# Patient Record
Sex: FEMALE | Race: WHITE | HISPANIC OR LATINO
[De-identification: names, ages, dates, MRNs, and addresses within clinical notes are randomized per-mention and may not be internally consistent; named-entity substitution may affect disease eponyms.]

---

## 2018-09-24 ENCOUNTER — APPOINTMENT (OUTPATIENT)
Dept: NEUROLOGY | Facility: CLINIC | Age: 33
End: 2018-09-24
Payer: COMMERCIAL

## 2018-09-24 VITALS
HEIGHT: 60 IN | SYSTOLIC BLOOD PRESSURE: 130 MMHG | BODY MASS INDEX: 35.34 KG/M2 | WEIGHT: 180 LBS | DIASTOLIC BLOOD PRESSURE: 82 MMHG

## 2018-09-24 DIAGNOSIS — G44.89 OTHER HEADACHE SYNDROME: ICD-10-CM

## 2018-09-24 DIAGNOSIS — Z82.49 FAMILY HISTORY OF ISCHEMIC HEART DISEASE AND OTHER DISEASES OF THE CIRCULATORY SYSTEM: ICD-10-CM

## 2018-09-24 PROCEDURE — 99204 OFFICE O/P NEW MOD 45 MIN: CPT

## 2018-09-24 NOTE — CONSULT LETTER
[Dear  ___] : Dear  [unfilled], [Courtesy Letter:] : I had the pleasure of seeing your patient, [unfilled], in my office today. [Please see my note below.] : Please see my note below. [Consult Closing:] : Thank you very much for allowing me to participate in the care of this patient.  If you have any questions, please do not hesitate to contact me. [Sincerely,] : Sincerely, [FreeTextEntry3] : Armand Watson MD.

## 2018-09-24 NOTE — PHYSICAL EXAM
[General Appearance - Alert] : alert [Oriented To Time, Place, And Person] : oriented to person, place, and time [Memory Recent] : recent memory was not impaired [Memory Remote] : remote memory was not impaired [Cranial Nerves Optic (II)] : visual acuity intact bilaterally,  visual fields full to confrontation, pupils equal round and reactive to light [Cranial Nerves Oculomotor (III)] : extraocular motion intact [Cranial Nerves Trigeminal (V)] : facial sensation intact symmetrically [Cranial Nerves Facial (VII)] : face symmetrical [Cranial Nerves Vestibulocochlear (VIII)] : hearing was intact bilaterally [Cranial Nerves Glossopharyngeal (IX)] : tongue and palate midline [Cranial Nerves Accessory (XI - Cranial And Spinal)] : head turning and shoulder shrug symmetric [Cranial Nerves Hypoglossal (XII)] : there was no tongue deviation with protrusion [Motor Tone] : muscle tone was normal in all four extremities [Motor Strength] : muscle strength was normal in all four extremities [Sensation Tactile Decrease] : light touch was intact [Sensation Pain / Temperature Decrease] : pain and temperature was intact [Sensation Vibration Decrease] : vibration was intact [Abnormal Walk] : normal gait [2+] : Ankle jerk left 2+ [Dysarthria] : no dysarthria [Aphasia] : no dysphasia/aphasia [Romberg's Sign] : Romberg's sign was negtive [Coordination - Dysmetria Impaired Finger-to-Nose Bilateral] : not present [Plantar Reflex Right Only] : normal on the right [Plantar Reflex Left Only] : normal on the left

## 2018-09-24 NOTE — HISTORY OF PRESENT ILLNESS
[FreeTextEntry1] : I saw this patient in the office today.\par \par She describes headaches that have been going on for many years.\par They have progressed to the point where they are daily.\par They wax and wane in intensity but often quite severe.\par When severe they are associated with nausea and/or photophobia.\par \par

## 2018-09-24 NOTE — ASSESSMENT
[FreeTextEntry1] : This is a 32-year-old woman with a long history of chronic.\par Likely this represents migraine that has evolved into chronic daily pattern.\par I will obtain an MRI of the brain.\par \par I have explained that there are essentially 2 strategies for dealing with chronic headaches.  The first is abortive medication of which there are numerous over-the-counter preparations as well as prescription options.  The second strategy is prophylactic medications.  I have explained that these are medications which prevent headaches when taken on a regular basis.  I have explained that there are several medications which have been found to be preventative against headaches.  I have further explained that none of the medications have an immediate effect.  They must build up in the system over a few weeks.  Most people notice that within a few weeks on the medication, the headache intensity is diminishing.  Within a few more weeks most people begin to notice that the frequency is decreasing as well.  I have explained that the preventive medications were all originally used for other conditions but were also found to work against chronic headaches.  The patient seemed to understand my explanation.\par \par I have suggested a trial of Pamelor starting at 25 mg at bedtime in an attempt to decrease the frequency and intensity of the headaches.\par \par I have discussed the potential side effects of the medication with the patient and have advised the patient to call me should any new symptoms occur.\par \par I will see her back in 6 weeks.

## 2018-11-24 ENCOUNTER — FORM ENCOUNTER (OUTPATIENT)
Age: 33
End: 2018-11-24

## 2018-11-25 ENCOUNTER — APPOINTMENT (OUTPATIENT)
Dept: MRI IMAGING | Facility: CLINIC | Age: 33
End: 2018-11-25
Payer: COMMERCIAL

## 2018-11-25 ENCOUNTER — OUTPATIENT (OUTPATIENT)
Dept: OUTPATIENT SERVICES | Facility: HOSPITAL | Age: 33
LOS: 1 days | End: 2018-11-25
Payer: COMMERCIAL

## 2018-11-25 DIAGNOSIS — G44.89 OTHER HEADACHE SYNDROME: ICD-10-CM

## 2018-11-25 PROCEDURE — 70551 MRI BRAIN STEM W/O DYE: CPT | Mod: 26

## 2018-11-25 PROCEDURE — 70551 MRI BRAIN STEM W/O DYE: CPT

## 2019-01-04 ENCOUNTER — APPOINTMENT (OUTPATIENT)
Dept: NEUROLOGY | Facility: CLINIC | Age: 34
End: 2019-01-04

## 2019-10-29 ENCOUNTER — EMERGENCY (EMERGENCY)
Facility: HOSPITAL | Age: 34
LOS: 1 days | Discharge: HOME | End: 2019-10-29
Attending: STUDENT IN AN ORGANIZED HEALTH CARE EDUCATION/TRAINING PROGRAM
Payer: COMMERCIAL

## 2019-10-29 VITALS
TEMPERATURE: 98 F | WEIGHT: 177.91 LBS | SYSTOLIC BLOOD PRESSURE: 177 MMHG | OXYGEN SATURATION: 100 % | HEIGHT: 60 IN | HEART RATE: 108 BPM | RESPIRATION RATE: 20 BRPM | DIASTOLIC BLOOD PRESSURE: 102 MMHG

## 2019-10-29 LAB
ALBUMIN SERPL ELPH-MCNC: 4.8 G/DL — SIGNIFICANT CHANGE UP (ref 3.3–5.2)
ALP SERPL-CCNC: 86 U/L — SIGNIFICANT CHANGE UP (ref 40–120)
ALT FLD-CCNC: 25 U/L — SIGNIFICANT CHANGE UP
ANION GAP SERPL CALC-SCNC: 12 MMOL/L — SIGNIFICANT CHANGE UP (ref 5–17)
APPEARANCE UR: CLEAR — SIGNIFICANT CHANGE UP
AST SERPL-CCNC: 26 U/L — SIGNIFICANT CHANGE UP
BACTERIA # UR AUTO: ABNORMAL
BILIRUB SERPL-MCNC: <0.2 MG/DL — LOW (ref 0.4–2)
BILIRUB UR-MCNC: NEGATIVE — SIGNIFICANT CHANGE UP
BUN SERPL-MCNC: 10 MG/DL — SIGNIFICANT CHANGE UP (ref 8–20)
CALCIUM SERPL-MCNC: 10 MG/DL — SIGNIFICANT CHANGE UP (ref 8.6–10.2)
CHLORIDE SERPL-SCNC: 105 MMOL/L — SIGNIFICANT CHANGE UP (ref 98–107)
CO2 SERPL-SCNC: 25 MMOL/L — SIGNIFICANT CHANGE UP (ref 22–29)
COLOR SPEC: YELLOW — SIGNIFICANT CHANGE UP
CREAT SERPL-MCNC: 0.65 MG/DL — SIGNIFICANT CHANGE UP (ref 0.5–1.3)
DIFF PNL FLD: ABNORMAL
EPI CELLS # UR: SIGNIFICANT CHANGE UP
GLUCOSE SERPL-MCNC: 114 MG/DL — SIGNIFICANT CHANGE UP (ref 70–115)
GLUCOSE UR QL: NEGATIVE MG/DL — SIGNIFICANT CHANGE UP
HCG SERPL-ACNC: <4 MIU/ML — SIGNIFICANT CHANGE UP
HCT VFR BLD CALC: 36.8 % — SIGNIFICANT CHANGE UP (ref 34.5–45)
HGB BLD-MCNC: 11.6 G/DL — SIGNIFICANT CHANGE UP (ref 11.5–15.5)
KETONES UR-MCNC: NEGATIVE — SIGNIFICANT CHANGE UP
LEUKOCYTE ESTERASE UR-ACNC: NEGATIVE — SIGNIFICANT CHANGE UP
LIDOCAIN IGE QN: 37 U/L — SIGNIFICANT CHANGE UP (ref 22–51)
MCHC RBC-ENTMCNC: 26.2 PG — LOW (ref 27–34)
MCHC RBC-ENTMCNC: 31.5 GM/DL — LOW (ref 32–36)
MCV RBC AUTO: 83.1 FL — SIGNIFICANT CHANGE UP (ref 80–100)
NITRITE UR-MCNC: NEGATIVE — SIGNIFICANT CHANGE UP
PH UR: 6.5 — SIGNIFICANT CHANGE UP (ref 5–8)
PLATELET # BLD AUTO: 383 K/UL — SIGNIFICANT CHANGE UP (ref 150–400)
POTASSIUM SERPL-MCNC: 3.8 MMOL/L — SIGNIFICANT CHANGE UP (ref 3.5–5.3)
POTASSIUM SERPL-SCNC: 3.8 MMOL/L — SIGNIFICANT CHANGE UP (ref 3.5–5.3)
PROT SERPL-MCNC: 8.1 G/DL — SIGNIFICANT CHANGE UP (ref 6.6–8.7)
PROT UR-MCNC: NEGATIVE MG/DL — SIGNIFICANT CHANGE UP
RBC # BLD: 4.43 M/UL — SIGNIFICANT CHANGE UP (ref 3.8–5.2)
RBC # FLD: 13.4 % — SIGNIFICANT CHANGE UP (ref 10.3–14.5)
RBC CASTS # UR COMP ASSIST: SIGNIFICANT CHANGE UP /HPF (ref 0–4)
SODIUM SERPL-SCNC: 142 MMOL/L — SIGNIFICANT CHANGE UP (ref 135–145)
SP GR SPEC: 1.02 — SIGNIFICANT CHANGE UP (ref 1.01–1.02)
TROPONIN T SERPL-MCNC: <0.01 NG/ML — SIGNIFICANT CHANGE UP (ref 0–0.06)
UROBILINOGEN FLD QL: NEGATIVE MG/DL — SIGNIFICANT CHANGE UP
WBC # BLD: 9.57 K/UL — SIGNIFICANT CHANGE UP (ref 3.8–10.5)
WBC # FLD AUTO: 9.57 K/UL — SIGNIFICANT CHANGE UP (ref 3.8–10.5)
WBC UR QL: SIGNIFICANT CHANGE UP

## 2019-10-29 PROCEDURE — 70450 CT HEAD/BRAIN W/O DYE: CPT | Mod: 26

## 2019-10-29 PROCEDURE — 99284 EMERGENCY DEPT VISIT MOD MDM: CPT

## 2019-10-29 PROCEDURE — 71046 X-RAY EXAM CHEST 2 VIEWS: CPT | Mod: 26

## 2019-10-29 RX ORDER — ACETAMINOPHEN 500 MG
650 TABLET ORAL ONCE
Refills: 0 | Status: COMPLETED | OUTPATIENT
Start: 2019-10-29 | End: 2019-10-29

## 2019-10-29 RX ADMIN — Medication 650 MILLIGRAM(S): at 22:27

## 2019-10-29 NOTE — ED PROVIDER NOTE - OBJECTIVE STATEMENT
32 yo female hx of htn not on medication. presenting with headache over the last week described as a burning sensation in the head associated with dizziness described as the room spinning especially if leaning forward as well as chest pain described as gradual tightening worse with inspiration. denies recent sick contacts travel cough or congestion fever chills nausea vomiting abdominal pain. last took advil this morning. no hx of cardiac issues non smoker no hx of blood clots. no family hx of cardiac issues MI or sudden death. no numbness or tingling in extremities.

## 2019-10-29 NOTE — ED ADULT NURSE NOTE - CAS EDN DISCHARGE ASSESSMENT
Awake/Symptoms improved/Patient baseline mental status/Alert and oriented to person, place and time/Dressing clean and dry/No adverse reaction to first time med in ED

## 2019-10-29 NOTE — ED PROVIDER NOTE - ATTENDING CONTRIBUTION TO CARE
I, Dr. Paige, performed a face to face bedside interview with this patient regarding history of present illness, review of symptoms and relevant past medical, social and family history.  I completed an independent physical examination.  I have also reviewed the ACP's note(s) and discussed the plan with the ACP.

## 2019-10-29 NOTE — ED PROVIDER NOTE - PATIENT PORTAL LINK FT
You can access the FollowMyHealth Patient Portal offered by [facility] by registering at the following website: [email or web address]. By joining [employer]’s FollowMyHealth portal, you will also be able to view your health information using other applications (apps) compatible with our system.

## 2019-10-29 NOTE — ED PROVIDER NOTE - CLINICAL SUMMARY MEDICAL DECISION MAKING FREE TEXT BOX
female with multiple medical complaints of 1 week headache dizziness chest pain   pt with elevated bp and HR in triage. will check labs ct and ekg and re-eval   tylenol for headache until ct back

## 2019-10-29 NOTE — ED ADULT TRIAGE NOTE - CHIEF COMPLAINT QUOTE
pt c/o headache x 1 week, pt tried Tylenol and Advil midday  today Tylenol at noon.  pt states she came in because her body started shaking.  pt states her balance is off she feels she will fall if she bends over, vision cloudy started this am

## 2019-10-29 NOTE — ED PROVIDER NOTE - NSFOLLOWUPINSTRUCTIONS_ED_ALL_ED_FT
please follow up with primary care doctor   tylenol and motrin for headache   for worsening of symptoms please return to the ER

## 2019-10-29 NOTE — ED ADULT NURSE NOTE - PUPILS PERRL
[address] [facility] EMERGENCY DEPT One [address] [id number] [phone] Work/School Note Date: 4/26/2017 To Whom It May concern: 
 
Houston Qureshi was seen and treated today in the emergency room by the following provider(s): 
Attending Provider: Kishore Scott MD. Alberto Zamora Special Instructions:  Off feet on Thursday and Friday/standing other than up to bathroom and similar Sincerely, 
 
 
 
 
Kishore Scott MD 
 
 
 

yes

## 2019-10-29 NOTE — ED ADULT NURSE NOTE - CHPI ED NUR SYMPTOMS NEG
no vomiting/no weakness/no numbness/no blurred vision/no confusion/no nausea/no change in level of consciousness/no loss of consciousness/no dizziness/no fever

## 2019-10-29 NOTE — ED PROVIDER NOTE - CARDIAC, MLM
Normal rate, regular rhythm.  Heart sounds S1, S2.  No murmurs, rubs or gallops. +left side reproducible chest tenderness.

## 2019-10-29 NOTE — ED ADULT TRIAGE NOTE - BP NONINVASIVE DIASTOLIC (MM HG)
no arm pain R/no back pain/no arthralgia/no myalgia/no muscle cramps/no neck pain/no arm pain L/no joint swelling/no muscle weakness
102

## 2019-10-30 VITALS
OXYGEN SATURATION: 100 % | SYSTOLIC BLOOD PRESSURE: 140 MMHG | RESPIRATION RATE: 20 BRPM | DIASTOLIC BLOOD PRESSURE: 77 MMHG | HEART RATE: 62 BPM

## 2019-10-30 PROCEDURE — 93005 ELECTROCARDIOGRAM TRACING: CPT

## 2019-10-30 PROCEDURE — 84702 CHORIONIC GONADOTROPIN TEST: CPT

## 2019-10-30 PROCEDURE — T1013: CPT

## 2019-10-30 PROCEDURE — 85027 COMPLETE CBC AUTOMATED: CPT

## 2019-10-30 PROCEDURE — 70450 CT HEAD/BRAIN W/O DYE: CPT

## 2019-10-30 PROCEDURE — 96374 THER/PROPH/DIAG INJ IV PUSH: CPT

## 2019-10-30 PROCEDURE — 36415 COLL VENOUS BLD VENIPUNCTURE: CPT

## 2019-10-30 PROCEDURE — 93010 ELECTROCARDIOGRAM REPORT: CPT

## 2019-10-30 PROCEDURE — 71046 X-RAY EXAM CHEST 2 VIEWS: CPT

## 2019-10-30 PROCEDURE — 83690 ASSAY OF LIPASE: CPT

## 2019-10-30 PROCEDURE — 81001 URINALYSIS AUTO W/SCOPE: CPT

## 2019-10-30 PROCEDURE — 84484 ASSAY OF TROPONIN QUANT: CPT

## 2019-10-30 PROCEDURE — 80053 COMPREHEN METABOLIC PANEL: CPT

## 2019-10-30 PROCEDURE — 99284 EMERGENCY DEPT VISIT MOD MDM: CPT | Mod: 25

## 2019-10-30 RX ORDER — KETOROLAC TROMETHAMINE 30 MG/ML
30 SYRINGE (ML) INJECTION ONCE
Refills: 0 | Status: DISCONTINUED | OUTPATIENT
Start: 2019-10-30 | End: 2019-10-30

## 2019-10-30 RX ADMIN — Medication 30 MILLIGRAM(S): at 01:30

## 2020-07-13 ENCOUNTER — APPOINTMENT (OUTPATIENT)
Dept: ANTEPARTUM | Facility: CLINIC | Age: 35
End: 2020-07-13
Payer: MEDICAID

## 2020-07-13 ENCOUNTER — ASOB RESULT (OUTPATIENT)
Age: 35
End: 2020-07-13

## 2020-07-13 PROCEDURE — 76801 OB US < 14 WKS SINGLE FETUS: CPT

## 2020-07-13 PROCEDURE — 76802 OB US < 14 WKS ADDL FETUS: CPT | Mod: 59

## 2020-09-09 ENCOUNTER — APPOINTMENT (OUTPATIENT)
Dept: ANTEPARTUM | Facility: CLINIC | Age: 35
End: 2020-09-09

## 2020-09-14 ENCOUNTER — APPOINTMENT (OUTPATIENT)
Dept: ANTEPARTUM | Facility: CLINIC | Age: 35
End: 2020-09-14
Payer: MEDICAID

## 2020-09-14 ENCOUNTER — ASOB RESULT (OUTPATIENT)
Age: 35
End: 2020-09-14

## 2020-09-14 PROCEDURE — 76811 OB US DETAILED SNGL FETUS: CPT

## 2020-09-14 PROCEDURE — 76812 OB US DETAILED ADDL FETUS: CPT | Mod: 59

## 2020-10-05 ENCOUNTER — APPOINTMENT (OUTPATIENT)
Dept: MATERNAL FETAL MEDICINE | Facility: CLINIC | Age: 35
End: 2020-10-05
Payer: MEDICAID

## 2020-10-05 ENCOUNTER — ASOB RESULT (OUTPATIENT)
Age: 35
End: 2020-10-05

## 2020-10-05 PROCEDURE — 99441: CPT

## 2020-10-13 ENCOUNTER — APPOINTMENT (OUTPATIENT)
Dept: ANTEPARTUM | Facility: CLINIC | Age: 35
End: 2020-10-13
Payer: MEDICAID

## 2020-10-13 ENCOUNTER — ASOB RESULT (OUTPATIENT)
Age: 35
End: 2020-10-13

## 2020-10-13 PROCEDURE — 76816 OB US FOLLOW-UP PER FETUS: CPT | Mod: 59

## 2020-11-10 ENCOUNTER — ASOB RESULT (OUTPATIENT)
Age: 35
End: 2020-11-10

## 2020-11-10 ENCOUNTER — APPOINTMENT (OUTPATIENT)
Dept: ANTEPARTUM | Facility: CLINIC | Age: 35
End: 2020-11-10
Payer: MEDICAID

## 2020-11-10 PROCEDURE — 99072 ADDL SUPL MATRL&STAF TM PHE: CPT

## 2020-11-10 PROCEDURE — 76816 OB US FOLLOW-UP PER FETUS: CPT

## 2020-11-10 PROCEDURE — 76819 FETAL BIOPHYS PROFIL W/O NST: CPT | Mod: 59

## 2020-11-10 PROCEDURE — 76820 UMBILICAL ARTERY ECHO: CPT

## 2020-11-17 ENCOUNTER — APPOINTMENT (OUTPATIENT)
Dept: ANTEPARTUM | Facility: CLINIC | Age: 35
End: 2020-11-17
Payer: MEDICAID

## 2020-11-17 ENCOUNTER — ASOB RESULT (OUTPATIENT)
Age: 35
End: 2020-11-17

## 2020-11-17 ENCOUNTER — APPOINTMENT (OUTPATIENT)
Dept: MATERNAL FETAL MEDICINE | Facility: CLINIC | Age: 35
End: 2020-11-17
Payer: MEDICAID

## 2020-11-17 VITALS
WEIGHT: 197 LBS | OXYGEN SATURATION: 98 % | HEART RATE: 94 BPM | RESPIRATION RATE: 18 BRPM | HEIGHT: 60 IN | SYSTOLIC BLOOD PRESSURE: 110 MMHG | DIASTOLIC BLOOD PRESSURE: 70 MMHG | BODY MASS INDEX: 38.68 KG/M2

## 2020-11-17 DIAGNOSIS — Z86.32 PERSONAL HISTORY OF GESTATIONAL DIABETES: ICD-10-CM

## 2020-11-17 DIAGNOSIS — O13.9 GESTATIONAL [PREGNANCY-INDUCED] HYPERTENSION W/OUT SIGNIFICANT PROTEINURIA, UNSPECIFIED TRIMESTER: ICD-10-CM

## 2020-11-17 DIAGNOSIS — O09.523 SUPERVISION OF ELDERLY MULTIGRAVIDA, THIRD TRIMESTER: ICD-10-CM

## 2020-11-17 DIAGNOSIS — E66.9 OBESITY, UNSPECIFIED: ICD-10-CM

## 2020-11-17 DIAGNOSIS — O99.213 OBESITY COMPLICATING PREGNANCY, THIRD TRIMESTER: ICD-10-CM

## 2020-11-17 PROCEDURE — 76815 OB US LIMITED FETUS(S): CPT | Mod: 59

## 2020-11-17 PROCEDURE — 93976 VASCULAR STUDY: CPT

## 2020-11-17 PROCEDURE — 76820 UMBILICAL ARTERY ECHO: CPT

## 2020-11-17 PROCEDURE — 99072 ADDL SUPL MATRL&STAF TM PHE: CPT

## 2020-11-17 PROCEDURE — 76821 MIDDLE CEREBRAL ARTERY ECHO: CPT

## 2020-11-17 PROCEDURE — 99215 OFFICE O/P EST HI 40 MIN: CPT | Mod: TH

## 2020-11-17 PROCEDURE — 76820 UMBILICAL ARTERY ECHO: CPT | Mod: 59

## 2020-11-17 PROCEDURE — 76819 FETAL BIOPHYS PROFIL W/O NST: CPT | Mod: 59

## 2020-11-17 PROCEDURE — 76821 MIDDLE CEREBRAL ARTERY ECHO: CPT | Mod: 59

## 2020-11-17 RX ORDER — VITAMIN C, CALCIUM, IRON, VITAMIN D3, VITAMIN E, VITAMIN B1, VITAMIN B2, VITAMIN B3, VITAMIN B6, FOLIC ACID, IODINE, ZINC, COPPER, DOCUSATE SODIUM, DOCOSAHEXAENOIC ACID (DHA) 27-1-50 MG
KIT ORAL
Refills: 0 | Status: ACTIVE | COMMUNITY

## 2020-11-17 RX ORDER — NORTRIPTYLINE HYDROCHLORIDE 25 MG/1
25 CAPSULE ORAL
Qty: 60 | Refills: 2 | Status: DISCONTINUED | COMMUNITY
Start: 2018-09-24 | End: 2020-11-17

## 2020-11-17 NOTE — CHIEF COMPLAINT
[G ___] : G [unfilled] [P ___] : P [unfilled] [de-identified] : dichorionic diamniotic twin gestation with fetal growth restriction in twin B

## 2020-11-17 NOTE — OB HISTORY
[Spontaneous] : Spontaneous conception [Sonogram] : sonogram [at ___ wks] : at [unfilled] weeks [Definite:  ___ (Date)] : the last menstrual period was [unfilled] [___] : no pregnancy complications reported [Pregnancy History] : patient did not receive anesthesia [FreeTextEntry1] : Prenatal records were not available for review.\par \par She had genetic counseling with our genetic counselor on October 5, 2020 because of advanced maternal age, fetal echogenic intracardiac focus in fetus 1, and twin pregnancy.

## 2020-11-17 NOTE — VITALS
[US Date: ___] : ultrasound performed on [unfilled]. [GA= ___ Weeks] : Results were GA of [unfilled] weeks [GA= ___ Days] : and [unfilled] day(s) [JOSE by US (date): ___] : The calculated JOSE by US is [unfilled] [By US] : this is the final JOSE

## 2020-11-17 NOTE — DISCUSSION/SUMMARY
[FreeTextEntry1] : She is 30 weeks and 2 days gestation by early ultrasound dating.\par \par She is overweight and obesity has been associated with a number of maternal complications such as gestational diabetes, pre-eclampsia, thrombophlebitis, labor abnormalities, post-term pregnancies,  delivery, and operative complications. Obesity has been associated with adverse fetal outcomes such as late stillbirth and  deliveries.  Obese women also have a two to three-fold increased incidence in congenital anomalies. There were no major fetal malformations seen during the fetal anatomy ultrasound examination of the twin fetuses. She told me that she had a normal gestational diabetes screening test result. \par \par Regarding her advanced maternal age, she had genetic counseling on 2020 at 24 weeks gestation. She declined prenatal diagnostic testing and NIPS testing. There were no major fetal birth defects identified in her fetuses during the second trimester fetal anatomy ultrasound examination.  I told her that advanced maternal age has been associated with a higher incidence of gestational diabetes, and preeclampsia /eclampsia. I also told her that advanced maternal age has been associated with an increased risk of stillbirths. She has not been taking a daily baby aspirin to reduce her risk of developing preeclampsia. \par \par She has a dichorionic diamniotic twin pregnancy. I told her that twins have an increased  morbidity and mortality mostly as a result of prematurity and congenital abnormalities.  I also told her that twin pregnancies are at risk for discordant fetal growth, and intrauterine growth restriction. An ultrasound on 11/10/20 reported that fetus 2 had an estimated fetal weight at the 7th percentile for gestational age which is consistent with fetal growth restriction. Today's ultrasound reported the umbilical artery Doppler for fetus 2 to to be elevated above the 97.5th percentile for gestational age. The fetal cerebral placental ratio was reported to be low which is suggestive of fetal brain sparing effect.  She was advised to reduce her activities, have frequent rest periods and no sexual intercourse. I told her that at this time I recommend twice weekly fetal surveillance with NST/BPP/Dopplers. Fetal growth ultrasounds should be performed at approximately 2 - 3 week intervals. I told her that dichorionic/diamniotic pregnancies with isolated fetal growth restriction and other concurrent conditions (obesity, AMA) should delivered by 36 0/7 to 36 6/7 weeks of gestation (ACOG Committee Opinion No. 764, 2019). I also told her that if fetus 2 continues to have abnormal umbilical artery Doppler studies the delivery may occur before 36 0/7 weeks gestation. She was scheduled to have a followup [facility] visit in 2 weeks. \par \par She states that she has been having total body itching for the past 3 months, mostly during the night. She has been using over the counter Benadryl before bedtime. She also states that she has  dry skin and uses daily moisturizing lotion.  She was advised to use a moisturizing soap bar or pH-balanced body washes to cleanse the skin. She was also advised to apply calamine lotion to her skin, and take Aveeno (oatmeal) bath treatments to help relieve the itching. She was advised to avoid hot or warm showers and baths. I told her to avoid cleansing products that contain strong detergents or perfumes that can make the skin dry or irritated.  I ordered fasting bile acids levels and liver function tests to be done as soon as possible. \par \par She is multiparous and we discussed the various methods available for contraception after delivery. She told me that she wants to have an operative sterilization procedure after her delivery. \par \par

## 2020-11-20 ENCOUNTER — APPOINTMENT (OUTPATIENT)
Dept: ANTEPARTUM | Facility: CLINIC | Age: 35
End: 2020-11-20
Payer: MEDICAID

## 2020-11-20 ENCOUNTER — ASOB RESULT (OUTPATIENT)
Age: 35
End: 2020-11-20

## 2020-11-20 LAB
ALBUMIN SERPL ELPH-MCNC: 3.7 G/DL
ALP BLD-CCNC: 179 U/L
ALT SERPL-CCNC: 45 U/L
AST SERPL-CCNC: 32 U/L
BILE AC SER-MCNC: 8 UMOL/L
BILIRUB DIRECT SERPL-MCNC: 0.1 MG/DL
BILIRUB INDIRECT SERPL-MCNC: 0.1 MG/DL
BILIRUB SERPL-MCNC: 0.2 MG/DL
PROT SERPL-MCNC: 6.7 G/DL

## 2020-11-20 PROCEDURE — 76820 UMBILICAL ARTERY ECHO: CPT

## 2020-11-20 PROCEDURE — 76818 FETAL BIOPHYS PROFILE W/NST: CPT

## 2020-11-20 PROCEDURE — 76821 MIDDLE CEREBRAL ARTERY ECHO: CPT | Mod: 59

## 2020-11-20 PROCEDURE — 76820 UMBILICAL ARTERY ECHO: CPT | Mod: 59

## 2020-11-20 PROCEDURE — 76818 FETAL BIOPHYS PROFILE W/NST: CPT | Mod: 59

## 2020-11-24 ENCOUNTER — APPOINTMENT (OUTPATIENT)
Dept: ANTEPARTUM | Facility: CLINIC | Age: 35
End: 2020-11-24
Payer: MEDICAID

## 2020-11-24 ENCOUNTER — ASOB RESULT (OUTPATIENT)
Age: 35
End: 2020-11-24

## 2020-11-24 PROCEDURE — 99072 ADDL SUPL MATRL&STAF TM PHE: CPT

## 2020-11-24 PROCEDURE — 76821 MIDDLE CEREBRAL ARTERY ECHO: CPT

## 2020-11-24 PROCEDURE — 76818 FETAL BIOPHYS PROFILE W/NST: CPT | Mod: 59

## 2020-11-24 PROCEDURE — 76821 MIDDLE CEREBRAL ARTERY ECHO: CPT | Mod: 59

## 2020-11-24 PROCEDURE — 76820 UMBILICAL ARTERY ECHO: CPT

## 2020-11-24 PROCEDURE — 76816 OB US FOLLOW-UP PER FETUS: CPT

## 2020-11-24 PROCEDURE — 93976 VASCULAR STUDY: CPT

## 2020-11-24 PROCEDURE — 76816 OB US FOLLOW-UP PER FETUS: CPT | Mod: 59

## 2020-12-01 ENCOUNTER — APPOINTMENT (OUTPATIENT)
Dept: MATERNAL FETAL MEDICINE | Facility: CLINIC | Age: 35
End: 2020-12-01
Payer: MEDICAID

## 2020-12-01 ENCOUNTER — ASOB RESULT (OUTPATIENT)
Age: 35
End: 2020-12-01

## 2020-12-01 ENCOUNTER — APPOINTMENT (OUTPATIENT)
Dept: ANTEPARTUM | Facility: CLINIC | Age: 35
End: 2020-12-01
Payer: MEDICAID

## 2020-12-01 VITALS
DIASTOLIC BLOOD PRESSURE: 84 MMHG | HEIGHT: 60 IN | OXYGEN SATURATION: 98 % | BODY MASS INDEX: 39.85 KG/M2 | HEART RATE: 94 BPM | SYSTOLIC BLOOD PRESSURE: 128 MMHG | RESPIRATION RATE: 18 BRPM | WEIGHT: 203 LBS

## 2020-12-01 DIAGNOSIS — O36.5992: ICD-10-CM

## 2020-12-01 DIAGNOSIS — O30.043 TWIN PREGNANCY, DICHORIONIC/DIAMNIOTIC, THIRD TRIMESTER: ICD-10-CM

## 2020-12-01 DIAGNOSIS — O36.5991 MATERNAL CARE FOR OTHER KNOWN OR SUSPECTED POOR FETAL GROWTH, UNSPECIFIED TRIMESTER, FETUS 1: ICD-10-CM

## 2020-12-01 DIAGNOSIS — O99.713 DISEASES OF THE SKIN AND SUBCUTANEOUS TISSUE COMPLICATING PREGNANCY, THIRD TRIMESTER: ICD-10-CM

## 2020-12-01 DIAGNOSIS — L29.9 DISEASES OF THE SKIN AND SUBCUTANEOUS TISSUE COMPLICATING PREGNANCY, THIRD TRIMESTER: ICD-10-CM

## 2020-12-01 DIAGNOSIS — Z3A.32 32 WEEKS GESTATION OF PREGNANCY: ICD-10-CM

## 2020-12-01 PROCEDURE — 93976 VASCULAR STUDY: CPT

## 2020-12-01 PROCEDURE — 76821 MIDDLE CEREBRAL ARTERY ECHO: CPT | Mod: 59

## 2020-12-01 PROCEDURE — 76820 UMBILICAL ARTERY ECHO: CPT

## 2020-12-01 PROCEDURE — 76818 FETAL BIOPHYS PROFILE W/NST: CPT | Mod: 59

## 2020-12-01 PROCEDURE — 99214 OFFICE O/P EST MOD 30 MIN: CPT | Mod: TH

## 2020-12-01 PROCEDURE — 76818 FETAL BIOPHYS PROFILE W/NST: CPT

## 2020-12-01 PROCEDURE — 99072 ADDL SUPL MATRL&STAF TM PHE: CPT

## 2020-12-01 RX ORDER — DIPHENHYDRAMINE HCL 2 %
25 CREAM (GRAM) TOPICAL
Refills: 0 | Status: ACTIVE | COMMUNITY

## 2020-12-01 NOTE — DISCUSSION/SUMMARY
[FreeTextEntry1] : She is 32 weeks and 2 days gestation by early ultrasound dating.\par \par She has a dichorionic diamniotic twin pregnancy. I  previously told her that twin pregnancies are at risk for discordant fetal growth, and intrauterine growth restriction. The last fetal growth ultrasound was done on 11/24/20 and reported twin A to have an estimated fetal weight of 3 lbs. 5 oz. or 1501 g which was at the 9th percentile for gestational age. Twin B was found to have an estimated fetal weight of 3 lbs. 4 oz. or 1485 g which is at the 8th percentile for gestational age. Both fetuses are considered to have fetal growth restriction due to the estimated fetal weights being less than the 10th percentile for gestational age. There was concordant fetal growth. \par \par Today's ultrasound reported the umbilical artery Doppler for twin A  to to be within normal limits for gestational age. The fetal cerebral placental ratio for twin A was reported to within normal limits.  The umbilical artery SD ratio for twin B was reported to be within normal limits. The fetal cerebral placental ratio for twin B was reported to be within normal limits. The biophysical profiles for twin A and twin B were reported to be within normal limits with scores of 10/10. I told her that both fetuses appeared to be healthy at this time based on the biophysical profile and Doppler testing results. She was again advised to reduce her activities, have frequent rest periods and no sexual intercourse. I told her that at this time I recommend once weekly fetal surveillance with NST/BPP/Dopplers. I told her that fetal growth ultrasounds should be performed at approximately 2 - 3 week intervals. I told her that dichorionic/diamniotic pregnancies with isolated fetal growth restriction and other concurrent conditions (obesity, AMA) should delivered by 36 0/7 to 36 6/7 weeks of gestation (ACOG Committee Opinion No. 764, February 2019). She was scheduled to have a followup [facility] visit in 2 weeks. \par \par She states that she continues to have total body itching. The itching gets worse during the night. She continues to take over the counter Benadryl before bedtime. She also states that she has dry skin and uses daily moisturizing lotion. I again told her to use a moisturizing soap bar or pH-balanced body washes to cleanse the skin. She states that she has been using the recommended calamine lotion to her skin with some relief of the itching. She again was advised to avoid hot or warm showers and baths. I told her to avoid cleansing products that contain strong detergents or perfumes that can make the skin dry or irritated.  I told her that the fasting bile acids levels and liver function tests done on 11/18/20 were reported to be within normal limits. I gave her a laboratory referral to repeat the fasting bile acids levels and liver function tests. \par \par

## 2020-12-01 NOTE — OB HISTORY
[Spontaneous] : Spontaneous conception [Sonogram] : sonogram [at ___ wks] : at [unfilled] weeks [___] : no pregnancy complications reported [Pregnancy History] : patient did not receive anesthesia [FreeTextEntry1] : Prenatal records were not available for review.\par \par She had genetic counseling with our genetic counselor on October 5, 2020 because of advanced maternal age, fetal echogenic intracardiac focus in fetus 1, and twin pregnancy. \par \par She had a maternal fetal medicine consultation with me on November 17, 2020 because of her twin gestation, isolated fetal growth in twin B, advanced maternal age, obesity, and pruritus during pregnancy.\par

## 2020-12-01 NOTE — CHIEF COMPLAINT
[G ___] : G [unfilled] [P ___] : P [unfilled] [de-identified] : dichorionic diamniotic twin gestation with fetal growth restriction

## 2020-12-01 NOTE — FAMILY HISTORY
[Age 35+ During Pregnancy] : not 35 or over during pregnancy [Reported Family History Of Birth Defects] : no congenital heart defects [Alfred-Sachs Carrier] : no Alfred-Sachs [Family History] : no mental retardation/autism [Reported Family History Of Genetic Disease] : no history of child defect in child of baby father

## 2020-12-04 ENCOUNTER — APPOINTMENT (OUTPATIENT)
Dept: ANTEPARTUM | Facility: CLINIC | Age: 35
End: 2020-12-04

## 2020-12-07 ENCOUNTER — TRANSCRIPTION ENCOUNTER (OUTPATIENT)
Age: 35
End: 2020-12-07

## 2020-12-08 ENCOUNTER — ASOB RESULT (OUTPATIENT)
Age: 35
End: 2020-12-08

## 2020-12-08 ENCOUNTER — APPOINTMENT (OUTPATIENT)
Dept: ANTEPARTUM | Facility: CLINIC | Age: 35
End: 2020-12-08
Payer: MEDICAID

## 2020-12-08 ENCOUNTER — TRANSCRIPTION ENCOUNTER (OUTPATIENT)
Age: 35
End: 2020-12-08

## 2020-12-08 ENCOUNTER — INPATIENT (INPATIENT)
Facility: HOSPITAL | Age: 35
LOS: 3 days | Discharge: HOME | End: 2020-12-12
Attending: OBSTETRICS & GYNECOLOGY | Admitting: OBSTETRICS & GYNECOLOGY
Payer: COMMERCIAL

## 2020-12-08 VITALS
DIASTOLIC BLOOD PRESSURE: 97 MMHG | HEART RATE: 93 BPM | RESPIRATION RATE: 14 BRPM | SYSTOLIC BLOOD PRESSURE: 140 MMHG | TEMPERATURE: 98 F

## 2020-12-08 DIAGNOSIS — O36.5990 MATERNAL CARE FOR OTHER KNOWN OR SUSPECTED POOR FETAL GROWTH, UNSPECIFIED TRIMESTER, NOT APPLICABLE OR UNSPECIFIED: ICD-10-CM

## 2020-12-08 DIAGNOSIS — Z3A.33 33 WEEKS GESTATION OF PREGNANCY: ICD-10-CM

## 2020-12-08 DIAGNOSIS — O30.043 TWIN PREGNANCY, DICHORIONIC/DIAMNIOTIC, THIRD TRIMESTER: ICD-10-CM

## 2020-12-08 DIAGNOSIS — Z90.49 ACQUIRED ABSENCE OF OTHER SPECIFIED PARTS OF DIGESTIVE TRACT: Chronic | ICD-10-CM

## 2020-12-08 DIAGNOSIS — O24.410 GESTATIONAL DIABETES MELLITUS IN PREGNANCY, DIET CONTROLLED: ICD-10-CM

## 2020-12-08 DIAGNOSIS — E66.9 OBESITY, UNSPECIFIED: ICD-10-CM

## 2020-12-08 DIAGNOSIS — O99.719 DISEASES OF THE SKIN AND SUBCUTANEOUS TISSUE COMPLICATING PREGNANCY, UNSPECIFIED TRIMESTER: ICD-10-CM

## 2020-12-08 DIAGNOSIS — Z29.9 ENCOUNTER FOR PROPHYLACTIC MEASURES, UNSPECIFIED: ICD-10-CM

## 2020-12-08 DIAGNOSIS — O26.893 OTHER SPECIFIED PREGNANCY RELATED CONDITIONS, THIRD TRIMESTER: ICD-10-CM

## 2020-12-08 DIAGNOSIS — O14.13 SEVERE PRE-ECLAMPSIA, THIRD TRIMESTER: ICD-10-CM

## 2020-12-08 DIAGNOSIS — O47.03 FALSE LABOR BEFORE 37 COMPLETED WEEKS OF GESTATION, THIRD TRIMESTER: ICD-10-CM

## 2020-12-08 LAB
ALBUMIN SERPL ELPH-MCNC: 3.4 G/DL — SIGNIFICANT CHANGE UP (ref 3.3–5.2)
ALP SERPL-CCNC: 261 U/L — HIGH (ref 40–120)
ALT FLD-CCNC: 118 U/L — HIGH
ANION GAP SERPL CALC-SCNC: 13 MMOL/L — SIGNIFICANT CHANGE UP (ref 5–17)
APPEARANCE UR: CLEAR — SIGNIFICANT CHANGE UP
APTT BLD: 28.8 SEC — SIGNIFICANT CHANGE UP (ref 27.5–35.5)
AST SERPL-CCNC: 94 U/L — HIGH
BACTERIA # UR AUTO: ABNORMAL
BASOPHILS # BLD AUTO: 0.04 K/UL — SIGNIFICANT CHANGE UP (ref 0–0.2)
BASOPHILS NFR BLD AUTO: 0.5 % — SIGNIFICANT CHANGE UP (ref 0–2)
BILIRUB SERPL-MCNC: 0.4 MG/DL — SIGNIFICANT CHANGE UP (ref 0.4–2)
BILIRUB UR-MCNC: NEGATIVE — SIGNIFICANT CHANGE UP
BLD GP AB SCN SERPL QL: SIGNIFICANT CHANGE UP
BUN SERPL-MCNC: 7 MG/DL — LOW (ref 8–20)
CALCIUM SERPL-MCNC: 10.1 MG/DL — SIGNIFICANT CHANGE UP (ref 8.6–10.2)
CHLORIDE SERPL-SCNC: 103 MMOL/L — SIGNIFICANT CHANGE UP (ref 98–107)
CO2 SERPL-SCNC: 17 MMOL/L — LOW (ref 22–29)
COLOR SPEC: YELLOW — SIGNIFICANT CHANGE UP
CREAT ?TM UR-MCNC: 29 MG/DL — SIGNIFICANT CHANGE UP
CREAT SERPL-MCNC: 0.51 MG/DL — SIGNIFICANT CHANGE UP (ref 0.5–1.3)
DIFF PNL FLD: NEGATIVE — SIGNIFICANT CHANGE UP
EOSINOPHIL # BLD AUTO: 0.03 K/UL — SIGNIFICANT CHANGE UP (ref 0–0.5)
EOSINOPHIL NFR BLD AUTO: 0.4 % — SIGNIFICANT CHANGE UP (ref 0–6)
EPI CELLS # UR: SIGNIFICANT CHANGE UP
FIBRINOGEN PPP-MCNC: 872 MG/DL — CRITICAL HIGH (ref 290–520)
GLUCOSE SERPL-MCNC: 65 MG/DL — LOW (ref 70–99)
GLUCOSE UR QL: NEGATIVE MG/DL — SIGNIFICANT CHANGE UP
HCT VFR BLD CALC: 37.8 % — SIGNIFICANT CHANGE UP (ref 34.5–45)
HGB BLD-MCNC: 12.4 G/DL — SIGNIFICANT CHANGE UP (ref 11.5–15.5)
HIV 1 & 2 AB SERPL IA.RAPID: SIGNIFICANT CHANGE UP
IMM GRANULOCYTES NFR BLD AUTO: 1.2 % — SIGNIFICANT CHANGE UP (ref 0–1.5)
INR BLD: 0.97 RATIO — SIGNIFICANT CHANGE UP (ref 0.88–1.16)
KETONES UR-MCNC: NEGATIVE — SIGNIFICANT CHANGE UP
LDH SERPL L TO P-CCNC: 272 U/L — HIGH (ref 98–192)
LEUKOCYTE ESTERASE UR-ACNC: ABNORMAL
LYMPHOCYTES # BLD AUTO: 1.61 K/UL — SIGNIFICANT CHANGE UP (ref 1–3.3)
LYMPHOCYTES # BLD AUTO: 20.9 % — SIGNIFICANT CHANGE UP (ref 13–44)
MAGNESIUM SERPL-MCNC: 4.6 MG/DL — HIGH (ref 1.6–2.6)
MCHC RBC-ENTMCNC: 27.3 PG — SIGNIFICANT CHANGE UP (ref 27–34)
MCHC RBC-ENTMCNC: 32.8 GM/DL — SIGNIFICANT CHANGE UP (ref 32–36)
MCV RBC AUTO: 83.1 FL — SIGNIFICANT CHANGE UP (ref 80–100)
MONOCYTES # BLD AUTO: 0.38 K/UL — SIGNIFICANT CHANGE UP (ref 0–0.9)
MONOCYTES NFR BLD AUTO: 4.9 % — SIGNIFICANT CHANGE UP (ref 2–14)
NEUTROPHILS # BLD AUTO: 5.56 K/UL — SIGNIFICANT CHANGE UP (ref 1.8–7.4)
NEUTROPHILS NFR BLD AUTO: 72.1 % — SIGNIFICANT CHANGE UP (ref 43–77)
NITRITE UR-MCNC: NEGATIVE — SIGNIFICANT CHANGE UP
PH UR: 7 — SIGNIFICANT CHANGE UP (ref 5–8)
PLATELET # BLD AUTO: 245 K/UL — SIGNIFICANT CHANGE UP (ref 150–400)
POTASSIUM SERPL-MCNC: 4 MMOL/L — SIGNIFICANT CHANGE UP (ref 3.5–5.3)
POTASSIUM SERPL-SCNC: 4 MMOL/L — SIGNIFICANT CHANGE UP (ref 3.5–5.3)
PROT ?TM UR-MCNC: 18 MG/DL — HIGH (ref 0–12)
PROT SERPL-MCNC: 6.9 G/DL — SIGNIFICANT CHANGE UP (ref 6.6–8.7)
PROT UR-MCNC: 30 MG/DL
PROT/CREAT UR-RTO: 0.6 RATIO — HIGH
PROTHROM AB SERPL-ACNC: 11.3 SEC — SIGNIFICANT CHANGE UP (ref 10.6–13.6)
RBC # BLD: 4.55 M/UL — SIGNIFICANT CHANGE UP (ref 3.8–5.2)
RBC # FLD: 15.6 % — HIGH (ref 10.3–14.5)
RBC CASTS # UR COMP ASSIST: SIGNIFICANT CHANGE UP /HPF (ref 0–4)
SARS-COV-2 RNA SPEC QL NAA+PROBE: SIGNIFICANT CHANGE UP
SODIUM SERPL-SCNC: 133 MMOL/L — LOW (ref 135–145)
SP GR SPEC: 1 — LOW (ref 1.01–1.02)
URATE SERPL-MCNC: 6.9 MG/DL — HIGH (ref 2.4–5.7)
UROBILINOGEN FLD QL: NEGATIVE MG/DL — SIGNIFICANT CHANGE UP
WBC # BLD: 7.71 K/UL — SIGNIFICANT CHANGE UP (ref 3.8–10.5)
WBC # FLD AUTO: 7.71 K/UL — SIGNIFICANT CHANGE UP (ref 3.8–10.5)
WBC UR QL: SIGNIFICANT CHANGE UP

## 2020-12-08 PROCEDURE — 76818 FETAL BIOPHYS PROFILE W/NST: CPT | Mod: 59

## 2020-12-08 PROCEDURE — 76820 UMBILICAL ARTERY ECHO: CPT | Mod: 59

## 2020-12-08 PROCEDURE — 93976 VASCULAR STUDY: CPT

## 2020-12-08 PROCEDURE — 76818 FETAL BIOPHYS PROFILE W/NST: CPT

## 2020-12-08 PROCEDURE — 76820 UMBILICAL ARTERY ECHO: CPT

## 2020-12-08 PROCEDURE — 76821 MIDDLE CEREBRAL ARTERY ECHO: CPT

## 2020-12-08 PROCEDURE — 99072 ADDL SUPL MATRL&STAF TM PHE: CPT

## 2020-12-08 PROCEDURE — 76815 OB US LIMITED FETUS(S): CPT | Mod: 59

## 2020-12-08 PROCEDURE — 99222 1ST HOSP IP/OBS MODERATE 55: CPT | Mod: GC

## 2020-12-08 PROCEDURE — 99231 SBSQ HOSP IP/OBS SF/LOW 25: CPT

## 2020-12-08 RX ORDER — MAGNESIUM SULFATE 500 MG/ML
2 VIAL (ML) INJECTION
Qty: 40 | Refills: 0 | Status: DISCONTINUED | OUTPATIENT
Start: 2020-12-08 | End: 2020-12-08

## 2020-12-08 RX ORDER — FAMOTIDINE 10 MG/ML
20 INJECTION INTRAVENOUS ONCE
Refills: 0 | Status: COMPLETED | OUTPATIENT
Start: 2020-12-08 | End: 2020-12-08

## 2020-12-08 RX ORDER — CEFAZOLIN SODIUM 1 G
2000 VIAL (EA) INJECTION ONCE
Refills: 0 | Status: COMPLETED | OUTPATIENT
Start: 2020-12-08 | End: 2020-12-08

## 2020-12-08 RX ORDER — MAGNESIUM SULFATE 500 MG/ML
4 VIAL (ML) INJECTION ONCE
Refills: 0 | Status: DISCONTINUED | OUTPATIENT
Start: 2020-12-08 | End: 2020-12-08

## 2020-12-08 RX ORDER — MAGNESIUM SULFATE 500 MG/ML
4 VIAL (ML) INJECTION ONCE
Refills: 0 | Status: COMPLETED | OUTPATIENT
Start: 2020-12-08 | End: 2020-12-08

## 2020-12-08 RX ORDER — OXYTOCIN 10 UNIT/ML
333.33 VIAL (ML) INJECTION
Qty: 20 | Refills: 0 | Status: DISCONTINUED | OUTPATIENT
Start: 2020-12-08 | End: 2020-12-12

## 2020-12-08 RX ORDER — SODIUM CHLORIDE 9 MG/ML
1000 INJECTION, SOLUTION INTRAVENOUS
Refills: 0 | Status: DISCONTINUED | OUTPATIENT
Start: 2020-12-08 | End: 2020-12-09

## 2020-12-08 RX ORDER — SODIUM CHLORIDE 9 MG/ML
1000 INJECTION, SOLUTION INTRAVENOUS ONCE
Refills: 0 | Status: COMPLETED | OUTPATIENT
Start: 2020-12-08 | End: 2020-12-09

## 2020-12-08 RX ORDER — FAMOTIDINE 10 MG/ML
20 INJECTION INTRAVENOUS ONCE
Refills: 0 | Status: COMPLETED | OUTPATIENT
Start: 2020-12-08 | End: 2020-12-09

## 2020-12-08 RX ORDER — CITRIC ACID/SODIUM CITRATE 300-500 MG
30 SOLUTION, ORAL ORAL ONCE
Refills: 0 | Status: COMPLETED | OUTPATIENT
Start: 2020-12-08 | End: 2020-12-09

## 2020-12-08 RX ORDER — MAGNESIUM SULFATE 500 MG/ML
2 VIAL (ML) INJECTION
Qty: 40 | Refills: 0 | Status: COMPLETED | OUTPATIENT
Start: 2020-12-08 | End: 2020-12-09

## 2020-12-08 RX ORDER — METOCLOPRAMIDE HCL 10 MG
10 TABLET ORAL ONCE
Refills: 0 | Status: DISCONTINUED | OUTPATIENT
Start: 2020-12-08 | End: 2020-12-10

## 2020-12-08 RX ADMIN — Medication 50 GM/HR: at 19:37

## 2020-12-08 RX ADMIN — FAMOTIDINE 20 MILLIGRAM(S): 10 INJECTION INTRAVENOUS at 17:30

## 2020-12-08 RX ADMIN — Medication 12 MILLIGRAM(S): at 17:33

## 2020-12-08 RX ADMIN — SODIUM CHLORIDE 125 MILLILITER(S): 9 INJECTION, SOLUTION INTRAVENOUS at 19:08

## 2020-12-08 RX ADMIN — Medication 200 GRAM(S): at 19:10

## 2020-12-08 NOTE — OB PROVIDER TRIAGE NOTE - CURRENT PREGNANCY COMPLICATIONS, OB PROFILE
Gestational Age less than 36 Weeks/Preeclampsia/Intrauterine Growth Restriction/Gestational Diabetes

## 2020-12-08 NOTE — OB PROVIDER H&P - NSHPPHYSICALEXAM_GEN_ALL_CORE
Vital Signs Last 24 Hrs  T(C): 36.8 (08 Dec 2020 15:52), Max: 36.8 (08 Dec 2020 15:44)  T(F): 98.24 (08 Dec 2020 15:52), Max: 98.24 (08 Dec 2020 15:52)  HR: 100 (08 Dec 2020 18:23) (85 - 114)  BP: 145/92 (08 Dec 2020 18:23) (140/97 - 161/87)  RR: 14 (08 Dec 2020 15:52) (14 - 14)  General: Alert and oriented x3, no acute distress  Cardiovascular: regular rate and rhythm, no murmurs, rubs or gallops appreciated on exam  Respiratory: clear to auscultation bilaterally  Abdominal: gravid uterus, non-tender to palpation  Pelvic: deferred   Extremities: no redness, tenderness or swelling in lower extremities bilaterally     FHT:   A) 150 bpm baseline , moderate variability, +accels, -deccels  B) 140 bpm baseilne, mod variability, +accels, -deccels    toco: irregular contractions q 4 minutes Vital Signs Last 24 Hrs  T(C): 36.8 (08 Dec 2020 15:52), Max: 36.8 (08 Dec 2020 15:44)  T(F): 98.24 (08 Dec 2020 15:52), Max: 98.24 (08 Dec 2020 15:52)  HR: 100 (08 Dec 2020 18:23) (85 - 114)  BP: 145/92 (08 Dec 2020 18:23) (140/97 - 161/87)  RR: 14 (08 Dec 2020 15:52) (14 - 14)  General: Alert and oriented x3, no acute distress  Cardiovascular: regular rate and rhythm, no murmurs, rubs or gallops appreciated on exam  Respiratory: clear to auscultation bilaterally  Abdominal: gravid uterus, non-tender to palpation  Pelvic: 1/20/-3  Extremities: no redness, tenderness or swelling in lower extremities bilaterally     FHT:   A) 150 bpm baseline , moderate variability, +accels, -deccels  B) 140 bpm baseilne, mod variability, +accels, -deccels    toco: irregular contractions q 4 minutes

## 2020-12-08 NOTE — OB PROVIDER H&P - NS_OBGYNHISTORY_OBGYN_ALL_OB_FT
obhx:  G1: 2001 normal spontaneous vaginal delivery   G2: 2004 normal spontaneous vaginal delivery, complicated by elevated BP  G3: 2007 normal spontaneous vaginal delivery  G4: 2011 normal spontaneous vaginal delivery   gynhx:  no history of abnormal pap smears, no history of STDs

## 2020-12-08 NOTE — CONSULT NOTE ADULT - PROBLEM SELECTOR RECOMMENDATION 6
- SCDs when not ambulating Increases risks for  delivery and postpartum complications. Venous thrombosis prophylaxis recommended

## 2020-12-08 NOTE — OB RN TRIAGE NOTE - CURRENT PREGNANCY COMPLICATIONS, OB PROFILE
Gestational Age less than 36 Weeks/Maternal Unknown GBS/GDMA1; Di-Di twins/Hypertensive Disorder/Gestational Diabetes/Multiple Gestation

## 2020-12-08 NOTE — OB RN PATIENT PROFILE - PMH
(normal spontaneous vaginal delivery)  ,  (high BP), ,    <<----- Click to add NO pertinent Past Medical History

## 2020-12-08 NOTE — OB PROVIDER H&P - CURRENT PREGNANCY COMPLICATIONS, OB PROFILE
Intrauterine Growth Restriction/Gestational Diabetes/Preeclampsia/Gestational Age less than 36 Weeks

## 2020-12-08 NOTE — OB RN PATIENT PROFILE - HARM RISK FACTORS
Overall lab results look good; as you are aware blood sugar control is suboptimal; hgb a1c 8.9%  No exposure to hep C; urinary microalbumin is low; folate/B12 OK, vit D a bit low--increase over the counter supplement;  One isolated liver test abnormal; repeat in future ;lipids acceptable.   no

## 2020-12-08 NOTE — OB PROVIDER H&P - PMH
<<----- Click to add NO pertinent Past Medical History  (normal spontaneous vaginal delivery)  ,  (high BP), ,

## 2020-12-08 NOTE — CHART NOTE - NSCHARTNOTEFT_GEN_A_CORE
Patient had a severe range BP at 21:21 of 161/67  BPs were cycled and the rest were found to be in normal range   Patient already with diagnosis of PEC with severe features currently on Mg  Plan for rescue steroids and delivery by CS for malpresentation  Will continue to monitor BPs and start PO anti-HTN meds if patient continues to have severe range BPs    D/W Dr. Bolden and Dr. Samson

## 2020-12-08 NOTE — OB PROVIDER TRIAGE NOTE - NSOBPROVIDERNOTE_OBGYN_ALL_OB_FT
PAT ZAVALA is a 35y  at 33w2d who presented to L&D for rule out pre-eclampsia.     - f/u PIH labs  - betamethasome 12mg q 24 hours for 2 doses for rescue course  - VICKI ZAVALAA is a 35y  at 33w2d who presented to L&D for rule out pre-eclampsia.     - f/u PIH labs  - betamethasone 12mg q 24 hours for 2 doses for rescue course  - continue to monitor BP    d/w Dr. Cunningham PAT ZAVALA is a 35y  at 33w2d who presented to L&D for rule out pre-eclampsia.     - f/u PIH labs  - betamethasone 12mg q 24 hours for 2 doses for rescue course  - continue to monitor BP    d/w Dr. Cunningham    OB attending:   see H&P note  Yessica Cunningham MD

## 2020-12-08 NOTE — OB PROVIDER TRIAGE NOTE - HISTORY OF PRESENT ILLNESS
PAT ZAVALA is a 35y  at 33w2d who presented to L&D for hypertension. She stated that she was in the office with elevated BP and was told to come into L&D. She denies any headache, blurry vision, right upper quadrant pain, or persistent swelling. Patient denies any regular painful contractions, leakage of fluid, vaginal bleeding, pain with urination, blood in the urine and reports good fetal movement. This pregnancy is complicated by a di/di twin gestation, GDMA1, personal history of pre-eclampsia and maternal pruritis for which she stated that she was told "everything was fine," and was prescribed benadryl for the itchiness.     This pregnancy is also complicated by IUGR   fetus A: EFW 9th%ile/ AC 7%ile  fetus B: EFW 8th%ile/ AC 4.2%ile    She was previously admitted to [facility] for pre-eclampsia and given betamethasone on 10/23 and 10/24.     obhx:  G1:  normal spontaneous vaginal delivery   G2:  normal spontaneous vaginal delivery, complicated by elevated BP  G3:  normal spontaneous vaginal delivery  G4:  normal spontaneous vaginal delivery   gynhx:  no history of abnormal pap smears, no history of STDs  pmhx: none  pshx: cholecystectomy   meds: pnv  allergies: nkda

## 2020-12-08 NOTE — OB RN PATIENT PROFILE - CURRENT PREGNANCY COMPLICATIONS, OB PROFILE
Gestational Age less than 36 Weeks/Intrauterine Growth Restriction/Maternal Unknown GBS/Preeclampsia/Gestational Diabetes/Multiple Gestation

## 2020-12-08 NOTE — CONSULT NOTE ADULT - ASSESSMENT
VICKI ZAVALAA is a 35y  at 33w2d who presented to L&D for hypertension at the office. She is being admitted for pre-eclampsia with severe features due to elevated LFTs. ZAVALAPAT HERNANDEZ is a 35y  at 33w2d wit Di-Di twin pregnancy, diet controlled GDM and obesity. She is being admitted for pre-eclampsia with severe features.

## 2020-12-08 NOTE — OB PROVIDER H&P - ASSESSMENT
PAT ZAVALA is a 35y  at 33w2d who presented to L&D for hypertension. She stated that she was in the office with elevated BP and was told to come into L&D     Plan    - Admission labs: CBC, type and screen, ABO confirmation, urinalysis, RPR  - aptt, cbc, cmp, random protein creatinine, fibrinogen assay, lactate dehydrogenase, protein/creatinine ratio, PT, PTT, INR, uric acid, urinalysis  - IV fluids: alternating LR and D5LR  - Pain management: none at this time  - Continuous FHT and Tocometry  - GBS prophylaxis: none required  -  section after a course of betamethasone  - betamethasone 12mg q24 hours  - magnesium sulfate therapy for pre-eclampsia with severe features PAT ZAVALA is a 35y  at 33w2d who presented to L&D for hypertension. She is being admitted for pre-eclampsia with severe features in the setting of IUGR    Plan    - Admission labs: CBC, type and screen, ABO confirmation, urinalysis, RPR  - aptt, cbc, cmp, random protein creatinine, fibrinogen assay, lactate dehydrogenase, protein/creatinine ratio, PT, PTT, INR, uric acid, urinalysis  - IV fluids: alternating LR and D5LR  - Pain management: none at this time  - Continuous FHT and Tocometry  - GBS prophylaxis: none required  -  section after a course of betamethasone  - betamethasone 12mg q24 hours  - magnesium sulfate therapy for pre-eclampsia with severe features

## 2020-12-08 NOTE — CONSULT NOTE ADULT - PROBLEM SELECTOR RECOMMENDATION 9
Patient has elevated p/c ratio 0.6 and elevated LFTs in the severe range.  - starting magnesium sulfate therapy  - delivery after completion of betamethasone course  - repeat PIH labs q 12 hours, deliver sooner if labs worsen Patient has elevated urine p/c ratio of 0.6 and elevated LFTs in the severe range.  - starting magnesium sulfate therapy  - delivery if worsening maternal/fetal condition or after completion of betamethasone course  - repeat PIH labs q 12 hours, deliver sooner if labs worsen - rescue course of betamethasone (12mg q 24 hrs, for 2 doses)   - previously had a course of betamethasone at [facility] on 10/23 & 10/24

## 2020-12-08 NOTE — CONSULT NOTE ADULT - PROBLEM SELECTOR RECOMMENDATION 5
patient stated that she has previously been worked up for pruritis and was told that everything was normal.   - benadryl as needed for itching  - bile acids ordered - work up for intrahepatic cholestasis of pregnancy was negative   - benadryl as needed for itching  - repeat bile acids ordered

## 2020-12-08 NOTE — CONSULT NOTE ADULT - PROBLEM SELECTOR RECOMMENDATION 8
- betamethasone 12mg q 24 hrs, stop after 2 doses.   - previously had a course of betamethasone at [facility] on 10/23 & 10/24 - SCDs while in bed - rescue course of betamethasone (12mg q 24 hrs, for 2 doses)   - previously had a course of betamethasone at [facility] on 10/23 & 10/24

## 2020-12-08 NOTE — OB PROVIDER H&P - ATTENDING COMMENTS
Patient admitted at 33.2 weeks with amita twins and now preeclampsia with severe features.  The patient offers no complaints.  The pregnancy has been followed for IUGR in both babies. She was admitted previously to [facility] in October for preeclampsia and received betamethasone course at that time.  Here today her Blood pressures are mainly 140/90s, and has had one severe range blood pressure.  Laboratory evaluation reveals elevated LFTs and Pr/Cr 0.6.  In the setting for Preeclampsia with severe features the patient is admitted for rescue betamethasone and magnesium for seizure prophylaxis.  Delivery by primary  section (presentation breech/transverse) after 2nd dose of beta, unless the patient's condition worsens or non-reassuring Fetal status then deliver sooner.  I reviewed all of this with the patient using a .    Yessica Cunningham MD

## 2020-12-08 NOTE — OB PROVIDER TRIAGE NOTE - NSHPPHYSICALEXAM_GEN_ALL_CORE
Vital Signs Last 24 Hrs  T(C): 36.8 (08 Dec 2020 15:44), Max: 36.8 (08 Dec 2020 15:44)  T(F): 98.2 (08 Dec 2020 15:44), Max: 98.2 (08 Dec 2020 15:44)  HR: 96 (08 Dec 2020 16:58) (92 - 96)  BP: 146/94 (08 Dec 2020 16:58) (140/97 - 148/98)  RR: 14 (08 Dec 2020 15:44) (14 - 14)  General: Alert and oriented x3, no acute distress  Cardiovascular: regular rate and rhythm, no murmurs, rubs or gallops appreciated on exam  Respiratory: clear to auscultation bilaterally  Abdominal: gravid uterus, non-tender to palpation  Pelvic: deferred  Extremities: no redness, tenderness or swelling in lower extremities bilaterally     FHT:   A) 140 bpm baseline , moderate variability, +accels, -deccels  B) 130 bpm baseline , moderate variability, +accels, -deccels    [place]: irregular contractions q5 minutes  Ultrasound:   A) Breech, maternal left, placenta anterior  B) Transverse, maternal right/superior posterior placenta

## 2020-12-08 NOTE — CONSULT NOTE ADULT - PROBLEM SELECTOR RECOMMENDATION 2
fetus A: EFW 9th%ile/ AC 7%ile/ fetus B: EFW 8th%ile/ AC 4.2%ile  - will continue to monitor FHT Fetus A: EFW 9th%ile/ AC 7%ile and Fetus B: EFW 8th%ile/ AC 4.2%ile  - continuous monitoring to evaluate fetal well being

## 2020-12-08 NOTE — CONSULT NOTE ADULT - SUBJECTIVE AND OBJECTIVE BOX
PAT ZAVALA is a 35y  at 33w2d who presented to L&D for hypertension. She stated that she was in the office with elevated BP and was told to come into L&D. She denies any headache, blurry vision, right upper quadrant pain, or persistent swelling. Patient denies any regular painful contractions, leakage of fluid, vaginal bleeding, pain with urination, blood in the urine and reports good fetal movement. This pregnancy is complicated by a di/di twin gestation, GDMA1, personal history of pre-eclampsia and maternal pruritis for which she stated that she was told "everything was fine," and was prescribed benadryl for the itchiness.     This pregnancy is also complicated by IUGR   fetus A: EFW 9th%ile/ AC 7%ile  fetus B: EFW 8th%ile/ AC 4.2%ile    She was previously admitted to [facility] for pre-eclampsia and given betamethasone on 10/23 and 10/24.     obhx:  G1:  normal spontaneous vaginal delivery   G2:  normal spontaneous vaginal delivery, complicated by elevated BP  G3:  normal spontaneous vaginal delivery  G4:  normal spontaneous vaginal delivery   gynhx:  no history of abnormal pap smears, no history of STDs  pmhx: none  pshx: cholecystectomy   meds: pnv  allergies: nkda      Meds: betamethasone Injectable 12 milliGRAM(s) IntraMuscular every 24 hours  ceFAZolin   IVPB 2000 milliGRAM(s) IV Intermittent once  citric acid/sodium citrate Solution 30 milliLiter(s) Oral once  famotidine Injectable 20 milliGRAM(s) IV Push once  lactated ringers Bolus 1000 milliLiter(s) IV Bolus once  lactated ringers. 1000 milliLiter(s) IV Continuous <Continuous>  magnesium sulfate  IVPB 4 Gram(s) IV Intermittent once  magnesium sulfate  IVPB 4 Gram(s) IV Intermittent once  magnesium sulfate Infusion 2 Gm/Hr IV Continuous <Continuous>  magnesium sulfate Infusion 2 Gm/Hr IV Continuous <Continuous>  metoclopramide Injectable 10 milliGRAM(s) IV Push once PRN  oxytocin Infusion 333.333 milliUNIT(s)/Min IV Continuous <Continuous>    Allergies: No Known Allergies    Social History: Denies alcohol, tobacco and drugs.     REVIEW OF SYSTEMS:  Constitutional: No weight loss, fever, chills, weakness or fatigue.  HEENT: Eyes: No visual loss, blurred vision, double vision or yellow sclerae.   Skin: No rash or itching.  Cardiovascular: No chest pain. No palpitations or edema.  Respiratory: No shortness of breath, cough  Gastrointestinal: No nausea, vomiting or diarrhea. No abdominal pain   Genitourinary: No pain or burning on urination.   Neurological: No headache, dizziness, syncope  Hematologic: No anemia, bleeding or bruising  Psychiatric: No history of depression or anxiety.  Endocrinologic: No reports of sweating, cold or heat intolerance.  Allergies: No history of asthma, hives, eczema or rhinitis.    MEDICATIONS  (STANDING):  betamethasone Injectable 12 milliGRAM(s) IntraMuscular every 24 hours  ceFAZolin   IVPB 2000 milliGRAM(s) IV Intermittent once  citric acid/sodium citrate Solution 30 milliLiter(s) Oral once  famotidine Injectable 20 milliGRAM(s) IV Push once  lactated ringers Bolus 1000 milliLiter(s) IV Bolus once  lactated ringers. 1000 milliLiter(s) (125 mL/Hr) IV Continuous <Continuous>  magnesium sulfate  IVPB 4 Gram(s) IV Intermittent once  magnesium sulfate  IVPB 4 Gram(s) IV Intermittent once  magnesium sulfate Infusion 2 Gm/Hr (50 mL/Hr) IV Continuous <Continuous>  magnesium sulfate Infusion 2 Gm/Hr (50 mL/Hr) IV Continuous <Continuous>  oxytocin Infusion 333.333 milliUNIT(s)/Min (1000 mL/Hr) IV Continuous <Continuous>    MEDICATIONS  (PRN):  metoclopramide Injectable 10 milliGRAM(s) IV Push once PRN Nausea and/or Vomiting      Vital Signs:  Vital Signs Last 24 Hrs  T(C): 36.8 (08 Dec 2020 18:18), Max: 36.8 (08 Dec 2020 15:44)  T(F): 98.2 (08 Dec 2020 18:18), Max: 98.24 (08 Dec 2020 15:52)  HR: 111 (08 Dec 2020 18:40) (85 - 114)  BP: 148/82 (08 Dec 2020 18:40) (133/90 - 161/87)  RR: 18 (08 Dec 2020 18:18) (14 - 18)    General: Alert and oriented x3, no acute distress  Cardiovascular: regular rate and rhythm, no murmurs, rubs or gallops appreciated on exam  Respiratory: clear to auscultation bilaterally  Abdominal: gravid uterus, non-tender to palpation  Pelvic: deferred   Extremities: no redness, tenderness or swelling in lower extremities bilaterally     FHT:   A) 150 bpm baseline , moderate variability, +accels, -deccels  B) 140 bpm baseilne, mod variability, +accels, -deccels    toco: irregular contractions q 4 minutes    Labs:                        12.4   7.71  )-----------( 245      ( 08 Dec 2020 17:21 )             37.8   12-    133<L>  |  103  |  7.0<L>  ----------------------------<  65<L>  4.0   |  17.0<L>  |  0.51    Ca    10.1      08 Dec 2020 17:21    TPro  6.9  /  Alb  3.4  /  TBili  0.4  /  DBili  x   /  AST  94<H>  /  ALT  118<H>  /  AlkPhos  261<H>  12-08    PT/INR - ( 08 Dec 2020 17:21 )   PT: 11.3 sec;   INR: 0.97 ratio         PTT - ( 08 Dec 2020 17:21 )  PTT:28.8 sec   PAT ZAVALA is a 35y  at 33w2d with twin pregnancy who presented to L&D because of hypertension. She was having an ultrasound/BPP/NST and was found to have elevated BP readings. She denies having headache, blurry vision, right upper quadrant pain, or persistent swelling. Patient denies having regular painful contractions, leakage of fluid, vaginal bleeding, pain with urination, blood in the urine and reports good fetal movement. This pregnancy is complicated by a di/di twin gestation, obesity, GDMA1, past history of pre-eclampsia, and maternal pruritis.     This pregnancy is also complicated by IUGR   fetus A: EFW 9th%ile/ AC 7%ile  fetus B: EFW 8th%ile/ AC 4.2%ile    She was previously admitted to [facility] for elevated blood pressures and given betamethasone on 10/23 and 10/24.     obhx:  G1:  normal spontaneous vaginal delivery   G2:  normal spontaneous vaginal delivery, complicated by elevated BP  G3:  normal spontaneous vaginal delivery  G4:  normal spontaneous vaginal delivery   gynhx:  no history of abnormal pap smears, no history of STDs  pmhx: none  pshx: cholecystectomy   meds: pnv  allergies: nkda    Meds: betamethasone Injectable 12 milliGRAM(s) IntraMuscular every 24 hours  ceFAZolin   IVPB 2000 milliGRAM(s) IV Intermittent once  citric acid/sodium citrate Solution 30 milliLiter(s) Oral once  famotidine Injectable 20 milliGRAM(s) IV Push once  lactated ringers Bolus 1000 milliLiter(s) IV Bolus once  lactated ringers. 1000 milliLiter(s) IV Continuous <Continuous>  magnesium sulfate  IVPB 4 Gram(s) IV Intermittent once  magnesium sulfate  IVPB 4 Gram(s) IV Intermittent once  magnesium sulfate Infusion 2 Gm/Hr IV Continuous <Continuous>  magnesium sulfate Infusion 2 Gm/Hr IV Continuous <Continuous>  metoclopramide Injectable 10 milliGRAM(s) IV Push once PRN  oxytocin Infusion 333.333 milliUNIT(s)/Min IV Continuous <Continuous>    Allergies: No Known Allergies    Social History: Denies alcohol, tobacco and drugs.     REVIEW OF SYSTEMS:  Constitutional: No weight loss, fever, chills, weakness or fatigue.  HEENT: Eyes: No visual loss, blurred vision, double vision or yellow sclerae.   Skin: No rash or itching.  Cardiovascular: No chest pain. No palpitations or edema.  Respiratory: No shortness of breath, cough  Gastrointestinal: No nausea, vomiting or diarrhea. No abdominal pain   Genitourinary: No pain or burning on urination.   Neurological: No headache, dizziness, syncope  Hematologic: No anemia, bleeding or bruising  Psychiatric: No history of depression or anxiety.  Endocrinologic: No reports of sweating, cold or heat intolerance.  Allergies: No history of asthma, hives, eczema or rhinitis.    MEDICATIONS  (STANDING):  betamethasone Injectable 12 milliGRAM(s) IntraMuscular every 24 hours  ceFAZolin   IVPB 2000 milliGRAM(s) IV Intermittent once  citric acid/sodium citrate Solution 30 milliLiter(s) Oral once  famotidine Injectable 20 milliGRAM(s) IV Push once  lactated ringers Bolus 1000 milliLiter(s) IV Bolus once  lactated ringers. 1000 milliLiter(s) (125 mL/Hr) IV Continuous <Continuous>  magnesium sulfate  IVPB 4 Gram(s) IV Intermittent once  magnesium sulfate  IVPB 4 Gram(s) IV Intermittent once  magnesium sulfate Infusion 2 Gm/Hr (50 mL/Hr) IV Continuous <Continuous>  magnesium sulfate Infusion 2 Gm/Hr (50 mL/Hr) IV Continuous <Continuous>  oxytocin Infusion 333.333 milliUNIT(s)/Min (1000 mL/Hr) IV Continuous <Continuous>    MEDICATIONS  (PRN):  metoclopramide Injectable 10 milliGRAM(s) IV Push once PRN Nausea and/or Vomiting    Vital Signs:  Vital Signs Last 24 Hrs  T(C): 36.8 (08 Dec 2020 18:18), Max: 36.8 (08 Dec 2020 15:44)  T(F): 98.2 (08 Dec 2020 18:18), Max: 98.24 (08 Dec 2020 15:52)  HR: 111 (08 Dec 2020 18:40) (85 - 114)  BP: 148/82 (08 Dec 2020 18:40) (133/90 - 161/87)  RR: 18 (08 Dec 2020 18:18) (14 - 18)    General: Alert and oriented x3, no acute distress  Cardiovascular: regular rate and rhythm, no murmurs, rubs or gallops appreciated on exam  Respiratory: clear to auscultation bilaterally  Abdominal: gravid uterus, non-tender to palpation  Pelvic: deferred   Extremities: no redness, tenderness or swelling in lower extremities bilaterally     FHT:   A) 150 bpm baseline , moderate variability, +accels, -deccels  B) 140 bpm baseilne, mod variability, +accels, -deccels    toco: irregular contractions q 4 minutes    Labs:                        12.4   7.71  )-----------( 245      ( 08 Dec 2020 17:21 )             37.8   12-08    133<L>  |  103  |  7.0<L>  ----------------------------<  65<L>  4.0   |  17.0<L>  |  0.51    Ca    10.1      08 Dec 2020 17:21    TPro  6.9  /  Alb  3.4  /  TBili  0.4  /  DBili  x   /  AST  94<H>  /  ALT  118<H>  /  AlkPhos  261<H>  12-08    PT/INR - ( 08 Dec 2020 17:21 )   PT: 11.3 sec;   INR: 0.97 ratio         PTT - ( 08 Dec 2020 17:21 )  PTT:28.8 sec

## 2020-12-08 NOTE — OB PROVIDER H&P - HISTORY OF PRESENT ILLNESS
PAT ZAVALA is a 35y  at 33w2d who presented to L&D for hypertension. She stated that she was in the office with elevated BP and was told to come into L&D. She denies any headache, blurry vision, right upper quadrant pain, or persistent swelling. Patient denies any regular painful contractions, leakage of fluid, vaginal bleeding, pain with urination, blood in the urine and reports good fetal movement. This pregnancy is complicated by a di/di twin gestation, GDMA1, personal history of pre-eclampsia and maternal pruritis for which she stated that she was told "everything was fine," and was prescribed benadryl for the itchiness.     This pregnancy is also complicated by IUGR   fetus A: EFW 9th%ile/ AC 7%ile  fetus B: EFW 8th%ile/ AC 4.2%ile    She was previously admitted to [facility] for pre-eclampsia and given betamethasone on 10/23 and 10/24.     obhx:  G1:  normal spontaneous vaginal delivery   G2:  normal spontaneous vaginal delivery, complicated by elevated BP  G3:  normal spontaneous vaginal delivery  G4:  normal spontaneous vaginal delivery   gynhx:  no history of abnormal pap smears, no history of STDs  pmhx: none  pshx: cholecystectomy   meds: pnv  allergies: nkda

## 2020-12-09 ENCOUNTER — TRANSCRIPTION ENCOUNTER (OUTPATIENT)
Age: 35
End: 2020-12-09

## 2020-12-09 LAB
ALBUMIN SERPL ELPH-MCNC: 3.1 G/DL — LOW (ref 3.3–5.2)
ALBUMIN SERPL ELPH-MCNC: 3.2 G/DL — LOW (ref 3.3–5.2)
ALP SERPL-CCNC: 241 U/L — HIGH (ref 40–120)
ALP SERPL-CCNC: 254 U/L — HIGH (ref 40–120)
ALT FLD-CCNC: 144 U/L — HIGH
ALT FLD-CCNC: 190 U/L — HIGH
ANION GAP SERPL CALC-SCNC: 13 MMOL/L — SIGNIFICANT CHANGE UP (ref 5–17)
ANION GAP SERPL CALC-SCNC: 14 MMOL/L — SIGNIFICANT CHANGE UP (ref 5–17)
APPEARANCE UR: CLEAR — SIGNIFICANT CHANGE UP
APTT BLD: 24.8 SEC — LOW (ref 27.5–35.5)
APTT BLD: 31.2 SEC — SIGNIFICANT CHANGE UP (ref 27.5–35.5)
AST SERPL-CCNC: 120 U/L — HIGH
AST SERPL-CCNC: 176 U/L — HIGH
BACTERIA # UR AUTO: ABNORMAL
BASOPHILS # BLD AUTO: 0.03 K/UL — SIGNIFICANT CHANGE UP (ref 0–0.2)
BASOPHILS # BLD AUTO: 0.04 K/UL — SIGNIFICANT CHANGE UP (ref 0–0.2)
BASOPHILS NFR BLD AUTO: 0.3 % — SIGNIFICANT CHANGE UP (ref 0–2)
BASOPHILS NFR BLD AUTO: 0.3 % — SIGNIFICANT CHANGE UP (ref 0–2)
BILIRUB SERPL-MCNC: 0.4 MG/DL — SIGNIFICANT CHANGE UP (ref 0.4–2)
BILIRUB SERPL-MCNC: 0.5 MG/DL — SIGNIFICANT CHANGE UP (ref 0.4–2)
BILIRUB UR-MCNC: NEGATIVE — SIGNIFICANT CHANGE UP
BUN SERPL-MCNC: 13 MG/DL — SIGNIFICANT CHANGE UP (ref 8–20)
BUN SERPL-MCNC: 9 MG/DL — SIGNIFICANT CHANGE UP (ref 8–20)
CALCIUM SERPL-MCNC: 7.3 MG/DL — LOW (ref 8.6–10.2)
CALCIUM SERPL-MCNC: 8.5 MG/DL — LOW (ref 8.6–10.2)
CHLORIDE SERPL-SCNC: 101 MMOL/L — SIGNIFICANT CHANGE UP (ref 98–107)
CHLORIDE SERPL-SCNC: 103 MMOL/L — SIGNIFICANT CHANGE UP (ref 98–107)
CO2 SERPL-SCNC: 15 MMOL/L — LOW (ref 22–29)
CO2 SERPL-SCNC: 16 MMOL/L — LOW (ref 22–29)
COLOR SPEC: YELLOW — SIGNIFICANT CHANGE UP
CREAT ?TM UR-MCNC: 72 MG/DL — SIGNIFICANT CHANGE UP
CREAT SERPL-MCNC: 0.62 MG/DL — SIGNIFICANT CHANGE UP (ref 0.5–1.3)
CREAT SERPL-MCNC: 0.87 MG/DL — SIGNIFICANT CHANGE UP (ref 0.5–1.3)
DIFF PNL FLD: ABNORMAL
EOSINOPHIL # BLD AUTO: 0 K/UL — SIGNIFICANT CHANGE UP (ref 0–0.5)
EOSINOPHIL # BLD AUTO: 0 K/UL — SIGNIFICANT CHANGE UP (ref 0–0.5)
EOSINOPHIL NFR BLD AUTO: 0 % — SIGNIFICANT CHANGE UP (ref 0–6)
EOSINOPHIL NFR BLD AUTO: 0 % — SIGNIFICANT CHANGE UP (ref 0–6)
EPI CELLS # UR: SIGNIFICANT CHANGE UP
FIBRINOGEN PPP-MCNC: 683 MG/DL — HIGH (ref 290–520)
FIBRINOGEN PPP-MCNC: 693 MG/DL — HIGH (ref 290–520)
GLUCOSE BLDC GLUCOMTR-MCNC: 101 MG/DL — HIGH (ref 70–99)
GLUCOSE BLDC GLUCOMTR-MCNC: 127 MG/DL — HIGH (ref 70–99)
GLUCOSE BLDC GLUCOMTR-MCNC: 131 MG/DL — HIGH (ref 70–99)
GLUCOSE BLDC GLUCOMTR-MCNC: 142 MG/DL — HIGH (ref 70–99)
GLUCOSE SERPL-MCNC: 105 MG/DL — HIGH (ref 70–99)
GLUCOSE SERPL-MCNC: 120 MG/DL — HIGH (ref 70–99)
GLUCOSE UR QL: NEGATIVE MG/DL — SIGNIFICANT CHANGE UP
HCT VFR BLD CALC: 33.3 % — LOW (ref 34.5–45)
HCT VFR BLD CALC: 36.1 % — SIGNIFICANT CHANGE UP (ref 34.5–45)
HGB BLD-MCNC: 10.9 G/DL — LOW (ref 11.5–15.5)
HGB BLD-MCNC: 11.7 G/DL — SIGNIFICANT CHANGE UP (ref 11.5–15.5)
HIV 1+2 AB+HIV1 P24 AG SERPL QL IA: SIGNIFICANT CHANGE UP
IMM GRANULOCYTES NFR BLD AUTO: 1.9 % — HIGH (ref 0–1.5)
IMM GRANULOCYTES NFR BLD AUTO: 3.1 % — HIGH (ref 0–1.5)
INR BLD: 0.88 RATIO — SIGNIFICANT CHANGE UP (ref 0.88–1.16)
INR BLD: 0.89 RATIO — SIGNIFICANT CHANGE UP (ref 0.88–1.16)
KETONES UR-MCNC: ABNORMAL
LDH SERPL L TO P-CCNC: 264 U/L — HIGH (ref 98–192)
LDH SERPL L TO P-CCNC: 330 U/L — HIGH (ref 98–192)
LEUKOCYTE ESTERASE UR-ACNC: NEGATIVE — SIGNIFICANT CHANGE UP
LYMPHOCYTES # BLD AUTO: 1.04 K/UL — SIGNIFICANT CHANGE UP (ref 1–3.3)
LYMPHOCYTES # BLD AUTO: 1.28 K/UL — SIGNIFICANT CHANGE UP (ref 1–3.3)
LYMPHOCYTES # BLD AUTO: 10.5 % — LOW (ref 13–44)
LYMPHOCYTES # BLD AUTO: 10.6 % — LOW (ref 13–44)
MAGNESIUM SERPL-MCNC: 5.5 MG/DL — HIGH (ref 1.6–2.6)
MAGNESIUM SERPL-MCNC: 6.4 MG/DL — HIGH (ref 1.6–2.6)
MCHC RBC-ENTMCNC: 27.4 PG — SIGNIFICANT CHANGE UP (ref 27–34)
MCHC RBC-ENTMCNC: 27.4 PG — SIGNIFICANT CHANGE UP (ref 27–34)
MCHC RBC-ENTMCNC: 32.4 GM/DL — SIGNIFICANT CHANGE UP (ref 32–36)
MCHC RBC-ENTMCNC: 32.7 GM/DL — SIGNIFICANT CHANGE UP (ref 32–36)
MCV RBC AUTO: 83.7 FL — SIGNIFICANT CHANGE UP (ref 80–100)
MCV RBC AUTO: 84.5 FL — SIGNIFICANT CHANGE UP (ref 80–100)
MONOCYTES # BLD AUTO: 0.23 K/UL — SIGNIFICANT CHANGE UP (ref 0–0.9)
MONOCYTES # BLD AUTO: 0.82 K/UL — SIGNIFICANT CHANGE UP (ref 0–0.9)
MONOCYTES NFR BLD AUTO: 2.3 % — SIGNIFICANT CHANGE UP (ref 2–14)
MONOCYTES NFR BLD AUTO: 6.8 % — SIGNIFICANT CHANGE UP (ref 2–14)
NEUTROPHILS # BLD AUTO: 8.42 K/UL — HIGH (ref 1.8–7.4)
NEUTROPHILS # BLD AUTO: 9.59 K/UL — HIGH (ref 1.8–7.4)
NEUTROPHILS NFR BLD AUTO: 79.2 % — HIGH (ref 43–77)
NEUTROPHILS NFR BLD AUTO: 85 % — HIGH (ref 43–77)
NITRITE UR-MCNC: NEGATIVE — SIGNIFICANT CHANGE UP
PH UR: 5 — SIGNIFICANT CHANGE UP (ref 5–8)
PLATELET # BLD AUTO: 261 K/UL — SIGNIFICANT CHANGE UP (ref 150–400)
PLATELET # BLD AUTO: 266 K/UL — SIGNIFICANT CHANGE UP (ref 150–400)
POTASSIUM SERPL-MCNC: 4.1 MMOL/L — SIGNIFICANT CHANGE UP (ref 3.5–5.3)
POTASSIUM SERPL-MCNC: 4.2 MMOL/L — SIGNIFICANT CHANGE UP (ref 3.5–5.3)
POTASSIUM SERPL-SCNC: 4.1 MMOL/L — SIGNIFICANT CHANGE UP (ref 3.5–5.3)
POTASSIUM SERPL-SCNC: 4.2 MMOL/L — SIGNIFICANT CHANGE UP (ref 3.5–5.3)
PROT ?TM UR-MCNC: 17 MG/DL — HIGH (ref 0–12)
PROT SERPL-MCNC: 6.5 G/DL — LOW (ref 6.6–8.7)
PROT SERPL-MCNC: 6.8 G/DL — SIGNIFICANT CHANGE UP (ref 6.6–8.7)
PROT UR-MCNC: 15 MG/DL
PROT/CREAT UR-RTO: 0.2 RATIO — SIGNIFICANT CHANGE UP
PROTHROM AB SERPL-ACNC: 10.3 SEC — LOW (ref 10.6–13.6)
PROTHROM AB SERPL-ACNC: 10.4 SEC — LOW (ref 10.6–13.6)
RBC # BLD: 3.98 M/UL — SIGNIFICANT CHANGE UP (ref 3.8–5.2)
RBC # BLD: 4.27 M/UL — SIGNIFICANT CHANGE UP (ref 3.8–5.2)
RBC # FLD: 15.6 % — HIGH (ref 10.3–14.5)
RBC # FLD: 15.7 % — HIGH (ref 10.3–14.5)
RBC CASTS # UR COMP ASSIST: SIGNIFICANT CHANGE UP /HPF (ref 0–4)
SARS-COV-2 IGG SERPL QL IA: POSITIVE
SARS-COV-2 IGM SERPL IA-ACNC: 1.9 INDEX — HIGH
SODIUM SERPL-SCNC: 131 MMOL/L — LOW (ref 135–145)
SODIUM SERPL-SCNC: 131 MMOL/L — LOW (ref 135–145)
SP GR SPEC: 1.02 — SIGNIFICANT CHANGE UP (ref 1.01–1.02)
T PALLIDUM AB TITR SER: NEGATIVE — SIGNIFICANT CHANGE UP
URATE SERPL-MCNC: 8 MG/DL — HIGH (ref 2.4–5.7)
URATE SERPL-MCNC: 8.6 MG/DL — HIGH (ref 2.4–5.7)
UROBILINOGEN FLD QL: NEGATIVE MG/DL — SIGNIFICANT CHANGE UP
WBC # BLD: 12.3 K/UL — HIGH (ref 3.8–10.5)
WBC # BLD: 9.91 K/UL — SIGNIFICANT CHANGE UP (ref 3.8–10.5)
WBC # FLD AUTO: 12.3 K/UL — HIGH (ref 3.8–10.5)
WBC # FLD AUTO: 9.91 K/UL — SIGNIFICANT CHANGE UP (ref 3.8–10.5)
WBC UR QL: SIGNIFICANT CHANGE UP

## 2020-12-09 PROCEDURE — 99221 1ST HOSP IP/OBS SF/LOW 40: CPT

## 2020-12-09 PROCEDURE — 99232 SBSQ HOSP IP/OBS MODERATE 35: CPT | Mod: GC

## 2020-12-09 RX ORDER — ONDANSETRON 8 MG/1
4 TABLET, FILM COATED ORAL EVERY 6 HOURS
Refills: 0 | Status: DISCONTINUED | OUTPATIENT
Start: 2020-12-10 | End: 2020-12-12

## 2020-12-09 RX ORDER — OXYCODONE HYDROCHLORIDE 5 MG/1
5 TABLET ORAL ONCE
Refills: 0 | Status: DISCONTINUED | OUTPATIENT
Start: 2020-12-09 | End: 2020-12-12

## 2020-12-09 RX ORDER — POLYETHYLENE GLYCOL 3350 17 G/17G
17 POWDER, FOR SOLUTION ORAL ONCE
Refills: 0 | Status: COMPLETED | OUTPATIENT
Start: 2020-12-09 | End: 2020-12-09

## 2020-12-09 RX ORDER — SODIUM CHLORIDE 9 MG/ML
1000 INJECTION, SOLUTION INTRAVENOUS
Refills: 0 | Status: DISCONTINUED | OUTPATIENT
Start: 2020-12-09 | End: 2020-12-10

## 2020-12-09 RX ORDER — DIPHENHYDRAMINE HCL 50 MG
25 CAPSULE ORAL EVERY 6 HOURS
Refills: 0 | Status: DISCONTINUED | OUTPATIENT
Start: 2020-12-09 | End: 2020-12-09

## 2020-12-09 RX ORDER — SIMETHICONE 80 MG/1
80 TABLET, CHEWABLE ORAL EVERY 4 HOURS
Refills: 0 | Status: DISCONTINUED | OUTPATIENT
Start: 2020-12-09 | End: 2020-12-12

## 2020-12-09 RX ORDER — DIPHENHYDRAMINE HCL 50 MG
25 CAPSULE ORAL EVERY 6 HOURS
Refills: 0 | Status: COMPLETED | OUTPATIENT
Start: 2020-12-09 | End: 2020-12-09

## 2020-12-09 RX ORDER — TETANUS TOXOID, REDUCED DIPHTHERIA TOXOID AND ACELLULAR PERTUSSIS VACCINE, ADSORBED 5; 2.5; 8; 8; 2.5 [IU]/.5ML; [IU]/.5ML; UG/.5ML; UG/.5ML; UG/.5ML
0.5 SUSPENSION INTRAMUSCULAR ONCE
Refills: 0 | Status: COMPLETED | OUTPATIENT
Start: 2020-12-09

## 2020-12-09 RX ORDER — MAGNESIUM SULFATE 500 MG/ML
4 VIAL (ML) INJECTION ONCE
Refills: 0 | Status: DISCONTINUED | OUTPATIENT
Start: 2020-12-10 | End: 2020-12-12

## 2020-12-09 RX ORDER — OXYCODONE HYDROCHLORIDE 5 MG/1
5 TABLET ORAL
Refills: 0 | Status: DISCONTINUED | OUTPATIENT
Start: 2020-12-09 | End: 2020-12-12

## 2020-12-09 RX ORDER — ENOXAPARIN SODIUM 100 MG/ML
40 INJECTION SUBCUTANEOUS DAILY
Refills: 0 | Status: DISCONTINUED | OUTPATIENT
Start: 2020-12-10 | End: 2020-12-12

## 2020-12-09 RX ORDER — OXYTOCIN 10 UNIT/ML
333.33 VIAL (ML) INJECTION
Qty: 20 | Refills: 0 | Status: DISCONTINUED | OUTPATIENT
Start: 2020-12-09 | End: 2020-12-12

## 2020-12-09 RX ORDER — ACETAMINOPHEN 500 MG
650 TABLET ORAL ONCE
Refills: 0 | Status: COMPLETED | OUTPATIENT
Start: 2020-12-09 | End: 2020-12-09

## 2020-12-09 RX ORDER — MAGNESIUM HYDROXIDE 400 MG/1
30 TABLET, CHEWABLE ORAL
Refills: 0 | Status: DISCONTINUED | OUTPATIENT
Start: 2020-12-09 | End: 2020-12-12

## 2020-12-09 RX ORDER — LANOLIN
1 OINTMENT (GRAM) TOPICAL EVERY 6 HOURS
Refills: 0 | Status: DISCONTINUED | OUTPATIENT
Start: 2020-12-09 | End: 2020-12-12

## 2020-12-09 RX ORDER — KETOROLAC TROMETHAMINE 30 MG/ML
30 SYRINGE (ML) INJECTION EVERY 6 HOURS
Refills: 0 | Status: DISCONTINUED | OUTPATIENT
Start: 2020-12-09 | End: 2020-12-10

## 2020-12-09 RX ORDER — NALOXONE HYDROCHLORIDE 4 MG/.1ML
0.1 SPRAY NASAL
Refills: 0 | Status: DISCONTINUED | OUTPATIENT
Start: 2020-12-10 | End: 2020-12-12

## 2020-12-09 RX ORDER — MAGNESIUM SULFATE 500 MG/ML
2 VIAL (ML) INJECTION
Qty: 40 | Refills: 0 | Status: DISCONTINUED | OUTPATIENT
Start: 2020-12-10 | End: 2020-12-12

## 2020-12-09 RX ORDER — ACETAMINOPHEN 500 MG
975 TABLET ORAL
Refills: 0 | Status: DISCONTINUED | OUTPATIENT
Start: 2020-12-09 | End: 2020-12-12

## 2020-12-09 RX ORDER — DEXAMETHASONE 0.5 MG/5ML
4 ELIXIR ORAL EVERY 6 HOURS
Refills: 0 | Status: DISCONTINUED | OUTPATIENT
Start: 2020-12-10 | End: 2020-12-12

## 2020-12-09 RX ORDER — IBUPROFEN 200 MG
600 TABLET ORAL EVERY 6 HOURS
Refills: 0 | Status: COMPLETED | OUTPATIENT
Start: 2020-12-09 | End: 2021-11-07

## 2020-12-09 RX ADMIN — Medication 100 MILLIGRAM(S): at 20:17

## 2020-12-09 RX ADMIN — SODIUM CHLORIDE 125 MILLILITER(S): 9 INJECTION, SOLUTION INTRAVENOUS at 06:44

## 2020-12-09 RX ADMIN — FAMOTIDINE 20 MILLIGRAM(S): 10 INJECTION INTRAVENOUS at 19:31

## 2020-12-09 RX ADMIN — SODIUM CHLORIDE 2000 MILLILITER(S): 9 INJECTION, SOLUTION INTRAVENOUS at 19:34

## 2020-12-09 RX ADMIN — POLYETHYLENE GLYCOL 3350 17 GRAM(S): 17 POWDER, FOR SOLUTION ORAL at 16:53

## 2020-12-09 RX ADMIN — Medication 650 MILLIGRAM(S): at 06:39

## 2020-12-09 RX ADMIN — Medication 50 GM/HR: at 21:35

## 2020-12-09 RX ADMIN — Medication 650 MILLIGRAM(S): at 07:20

## 2020-12-09 RX ADMIN — Medication 25 MILLIGRAM(S): at 00:22

## 2020-12-09 RX ADMIN — Medication 12 MILLIGRAM(S): at 17:30

## 2020-12-09 RX ADMIN — Medication 25 MILLIGRAM(S): at 22:50

## 2020-12-09 RX ADMIN — Medication 1000 MILLIUNIT(S)/MIN: at 21:39

## 2020-12-09 RX ADMIN — Medication 30 MILLILITER(S): at 19:33

## 2020-12-09 NOTE — PROGRESS NOTE ADULT - PROBLEM SELECTOR PLAN 1
Patient has elevated p/c ratio 0.6 and elevated LFTs in the severe range. LFTs worsening on repeat labs. Also patient is developing a headache.   - Continue magnesium sulfate therapy  - Consider delivery sooner than second dose of betamethasone because of worsening LFTs.  - 650 mg Tylenol for headache

## 2020-12-09 NOTE — PROGRESS NOTE ADULT - PROBLEM SELECTOR PLAN 8
- betamethasone 12mg q 24 hrs, stop after 2 doses.   - previously had a course of betamethasone at [facility] on 10/23 & 10/24.

## 2020-12-09 NOTE — CHART NOTE - NSCHARTNOTEFT_GEN_A_CORE
Patient is currently admitted for pre-eclampsia with severe features at 33 weeks and 3 days.  She is s/p rescue steroids (2nd dose at 1700 today).  She is currently on Mg for seizure ppx.  Di/Di twins gestation with IUGR and normal dopplers.  Patient's BPs have been stable.  Patient's PEC labs have been exacerbating  AST 94->120->176  ->144->190  ->264->330  In light of her worsening lab value, the concern is that her PEC with severe features is exacerbating and there is a possibility of developing HELLP syndrome  Due to these finding a delivery is recommend by MFM  Due to malpresentation of twins (transverse/breech) delivery by CS is recommend   These new findings and plan of care were discussed with the patient, she verbalized understanding    D/W Dr. Cunningham and Dr. Bolden

## 2020-12-09 NOTE — CHART NOTE - NSCHARTNOTEFT_GEN_A_CORE
Patient was seen and examined at bedside. Denies headaches, visual disturbances, RUQ pain, epigastric pain and new-onset edema. Denies drowsiness, somnolence and SOB. No complaints at this time.     Vital Signs Last 24 Hrs  T(C): 37.0 (08 Dec 2020 22:00), Max: 37.0 (08 Dec 2020 22:00)  T(F): 98.6 (08 Dec 2020 22:00), Max: 98.6 (08 Dec 2020 22:00)  HR: 119 (08 Dec 2020 23:59) (85 - 135)  BP: 148/85 (08 Dec 2020 23:50) (124/82 - 161/87)  RR: 18 (08 Dec 2020 22:00) (14 - 18)  SpO2: 98% (08 Dec 2020 23:59) (96% - 99%)    General: NAD, well-appearing  Heart: RRR  Lungs: CTAB  Ext: non-tender, non-edematous, 2+ DTRs in UE bilaterally     Urine output: 100cc/hour    Magnesium, Serum (12.08.20 @ 23:31)  Magnesium, Serum: 4.6 mg/dL    Continue with current management.

## 2020-12-09 NOTE — OB NEONATOLOGY/PEDIATRICIAN DELIVERY SUMMARY - NSPEDSNEONOTESB_OBGYN_ALL_OB_FT
Requested by Dr Mendoza to attend this PC/S of a 36yo  mother at 33.3 weeks secondary to pre-eclampsia with severe features. Pregnancy is complicated by di/di twin gestation, nl dopplers, IUGR, pre-eclampsia, GDM diet controlled.  She is    HIV neg, HBsAg neg, RPR NR, Rubella Imm, GBS Unk.  No evidence of ROM or pre-term labor. She received Betamethasone on.10/22- and a rescue course of betamethasone was given -.  She is  receiving Mag and Cefalozolin.   L&D:  AROM at delivery.  Baby A born   with good cry, transferred to warmer, orally suctioned, dried, and examined. Requested by Dr Mendoza to attend this PC/S of a 36yo  mother at 33.3 weeks secondary to pre-eclampsia with severe features. Pregnancy is complicated by di/di twin gestation, nl dopplers, IUGR, pre-eclampsia, GDM diet controlled.  She is  B pos,  HIV neg, HBsAg neg, RPR NR, Rubella Imm, GBS Unk, COVID19 neg.  No evidence of ROM or pre-term labor. She received Betamethasone on.10/22- and a rescue course of betamethasone was given -.  She is  receiving Mag and Cefalozolin.   L&D:  [facility] at delivery.  Baby B born   with good cry, transferred to warmer, orally suctioned, dried, and examined. Requested by Dr Mendoza to attend this PC/S of a 34yo  mother at 33.3 weeks secondary to pre-eclampsia with severe features. Pregnancy is complicated by di/di twin gestation, nl dopplers, IUGR, pre-eclampsia, GDM diet controlled.  She is  O pos,  HIV neg, HBsAg neg, RPR NR, Rubella Imm, GBS Unk, COVID19 neg.  No evidence of ROM or pre-term labor. She received Betamethasone on.10/22- and a rescue course of betamethasone was given -.  She is  receiving Mag and Cefalozolin.   L&D:  [facility] at delivery.  Baby B born   with good cry, transferred to warmer, orally suctioned, dried, and examined. Requested by Dr Mendoza to attend this PC/S of a 34yo  mother at 33.3 weeks secondary to pre-eclampsia with severe features. Pregnancy is complicated by di/di twin gestation, nl dopplers, IUGR, pre-eclampsia, GDM diet controlled.  She is  O pos,  HIV neg, HBsAg neg, RPR NR, Rubella Imm, GBS Unk, COVID19 neg.  No evidence of ROM or pre-term labor. She received Betamethasone on.10/22- and a rescue course of betamethasone was given -.  She is  receiving Mag and Cefalozolin.   L&D:  AR[facility] at delivery.  Baby B born breech  with good cry, transferred to warmer, orally suctioned, dried, and examined. Baby continued to look dusky in spite of strong cry, started to grunt and developed retractions for which CPAP 5 was initiated. O2 sat 70 at aprox 3 mins of life, FIO2 adjusted to achieve target O2 sats. APGAR Scores: 8/9.Infant showed to parents and then transferred to NICU for further evaluation and management.

## 2020-12-09 NOTE — OB PROVIDER LABOR PROGRESS NOTE - NS_OBIHIFHRDETAILS_OBGYN_ALL_OB_FT
AA: baseline 110s, moderate variability, +accels, -decels   BB: baseline 120s, moderate variability, +accels, -decels

## 2020-12-09 NOTE — PROGRESS NOTE ADULT - ATTENDING COMMENTS
MFM - Patient seen and evaluated with the assistance of the [place] .  Di-Di twin IUP at 33+ weeks admitted with preeclampsia with severe feature in a pregnancy complicated by FGR.  Patient was sent from office yesterday after noted to have elevated BP at routine exam.  In triage, also noted to have hypertension and laboratory evaluation significant for elevated transaminases and proteinuria.  FHRT reassuring with intermittent contractions.  The patient was started on magnesium sulfate for seizure prophylaxis and a course of rescue corticosteroids was initiated.  The patient was counseled regarding her diagnosis of preeclampsia with severe features and the results of her evaluation to date was reviewed. The plan is to complete rescue betamethasone today and proceed with delivery.  Given her gestational age, FGR, and preeclampsia diagnosis, there is no role for continued expectant management after completion of ACS.  The patient voiced understanding of this recommendation and is in agreement. All questions were answered to the best of my ability.  Patient is also aware there may be indication for earlier delivery if worsening maternal or fetal status.  Will continue FHRT and trend labs, BP and maternal symptoms.  NICU consult requested to speak to patient regarding care of infants after delivery. OB and L&D team aware of care plan.    Karrie Bhatti MD  Maternal Fetal Medicine

## 2020-12-09 NOTE — PROGRESS NOTE ADULT - SUBJECTIVE AND OBJECTIVE BOX
PAT ZAVALA is a 35y  at 33w3d who is admitted to L&D for pre-eclampsia with severe features. Pregnancy is complicated by di/di twin gestation, IUGR, pre-eclampsia and maternal pruritis.    Hospital course:   - Patient was sent to L&D for evaluation of elevated BP at the office. She had a pre-eclampsia work up that showed elevated LFTs and elevated p/c ratio. She was started on magnesium therapy and was started on a rescue course of betamethasone. She was previously admitted to [facility] for pre-eclampsia and was given a course of  betamethasone on 10/23 and 10/24.    Subjective:   She denies right upper quadrant pain, or persistent swelling. She states that she is having some contractions, irregular and 5-6 minutes apart at times. She also states that she has a headache located on the frontal left side, with tearing and pain of the right eye.     Meds: betamethasone Injectable 12 milliGRAM(s) IntraMuscular every 24 hours  ceFAZolin   IVPB 2000 milliGRAM(s) IV Intermittent once  citric acid/sodium citrate Solution 30 milliLiter(s) Oral once  famotidine Injectable 20 milliGRAM(s) IV Push once  lactated ringers Bolus 1000 milliLiter(s) IV Bolus once  lactated ringers. 1000 milliLiter(s) IV Continuous <Continuous>  magnesium sulfate  IVPB 4 Gram(s) IV Intermittent once  magnesium sulfate  IVPB 4 Gram(s) IV Intermittent once  magnesium sulfate Infusion 2 Gm/Hr IV Continuous <Continuous>  magnesium sulfate Infusion 2 Gm/Hr IV Continuous <Continuous>  metoclopramide Injectable 10 milliGRAM(s) IV Push once PRN  oxytocin Infusion 333.333 milliUNIT(s)/Min IV Continuous <Continuous>      MEDICATIONS  (STANDING):  betamethasone Injectable 12 milliGRAM(s) IntraMuscular every 24 hours  ceFAZolin   IVPB 2000 milliGRAM(s) IV Intermittent once  citric acid/sodium citrate Solution 30 milliLiter(s) Oral once  famotidine Injectable 20 milliGRAM(s) IV Push once  lactated ringers Bolus 1000 milliLiter(s) IV Bolus once  lactated ringers. 1000 milliLiter(s) (125 mL/Hr) IV Continuous <Continuous>  magnesium sulfate  IVPB 4 Gram(s) IV Intermittent once  magnesium sulfate  IVPB 4 Gram(s) IV Intermittent once  magnesium sulfate Infusion 2 Gm/Hr (50 mL/Hr) IV Continuous <Continuous>  magnesium sulfate Infusion 2 Gm/Hr (50 mL/Hr) IV Continuous <Continuous>  oxytocin Infusion 333.333 milliUNIT(s)/Min (1000 mL/Hr) IV Continuous <Continuous>    MEDICATIONS  (PRN):  metoclopramide Injectable 10 milliGRAM(s) IV Push once PRN Nausea and/or Vomiting    Vital Signs:  Vital Signs Last 24 Hrs  T(C): 37.0 (09 Dec 2020 06:00), Max: 37.0 (08 Dec 2020 22:00)  T(F): 98.6 (09 Dec 2020 06:00), Max: 98.6 (08 Dec 2020 22:00)  HR: 109 (09 Dec 2020 06:39) (85 - 135)  BP: 139/77 (09 Dec 2020 06:35) (109/73 - 161/87)  RR: 18 (09 Dec 2020 06:00) (14 - 18)  SpO2: 98% (09 Dec 2020 06:39) (95% - 99%)    General: Alert and oriented x3, no acute distress  Cardiovascular: regular rate and rhythm, no murmurs, rubs or gallops appreciated on exam  Respiratory: clear to auscultation bilaterally  Abdominal: gravid uterus, non-tender to palpation  Pelvic: deferred   Extremities: no redness, tenderness or swelling in lower extremities bilaterally. Reflexes 3+     FHT:   A) 120 bpm baseline , moderate variability, +accels, -deccels  B) 120 bpm baseline , mod variability, +accels, -deccels    toco: irregular contractions q 4-7 minutes    Labs:                        12.4   7.71  )-----------( 245      ( 08 Dec 2020 17:21 )             37.8                           11.7   9.91  )-----------( 266      ( 09 Dec 2020 05:19 )             36.1       12-08    133<L>  |  103  |  7.0<L>  ----------------------------<  65<L>  4.0   |  17.0<L>  |  0.51    Ca    10.1      08 Dec 2020 17:21    TPro  6.9  /  Alb  3.4  /  TBili  0.4  /  DBili  x   /  AST  94<H>  /  ALT  118<H>  /  AlkPhos  261<H>      131<L>  |  103  |  9.0  ----------------------------<  105<H>  4.1   |  15.0<L>  |  0.62    Ca    8.5<L>      09 Dec 2020 05:19  Mg     5.5         TPro  6.8  /  Alb  3.2<L>  /  TBili  0.5  /  DBili  x   /  AST  120<H>  /  ALT  144<H>  /  AlkPhos  254<H>        PT/INR - ( 08 Dec 2020 17:21 )   PT: 11.3 sec;   INR: 0.97 ratio         PTT - ( 08 Dec 2020 17:21 )  PTT:28.8 sec    PT/INR - ( 09 Dec 2020 05:19 )   PT: 10.3 sec;   INR: 0.88 ratio         PTT - ( 09 Dec 2020 05:19 )  PTT:31.2 sec    Magnesium, Serum (20 @ 05:19)    Magnesium, Serum: 5.5 mg/dL

## 2020-12-09 NOTE — PROGRESS NOTE ADULT - PROBLEM SELECTOR PLAN 5
patient stated that she has previously been worked up for pruritis and was told that everything was normal.   - benadryl as needed for itching  - bile acids ordered.

## 2020-12-09 NOTE — CHART NOTE - NSCHARTNOTEFT_GEN_A_CORE
PAT ZAVALA is comfortable and denies the following: headache, visual disturbances, shortness of breath, right upper quadrant pain, nausea or vomiting. She is undergoing magnesium sulfate therapy for pre-eclampsia with severe features.     T(C): 36.9 (10:27)  T(F): 98.42 (10:27)  HR: 106 (15:21)  BP: 148/84 (15:08)  RR: 17 (10:27)  SpO2: 96% (15:21)  General: alert and oriented x 3, no acute distress  Cardiovascular: regular rate and rhythm, no murmurs, rubs or gallops appreciated on exam  Respiratory: clear to auscultation bilaterally  Abdominal: soft, non-tender to palpation. No right upper quadrant tenderness  Extremities: no redness, swelling or tenderness to palpation bilaterally  Neurological: Reflexes 2+ in upper and lower extremities bilaterally      12-08 @ 07:01  -  12-09 @ 07:00  --------------------------------------------------------  IN: 1525 mL / OUT: 1265 mL / NET: 260 mL    12-09 @ 07:01  -  12-09 @ 15:27  --------------------------------------------------------  IN: 1000 mL / OUT: 1150 mL / NET: -150 mL      Magnesium, Serum: 5.5 mg/dL (12-09-20 @ 05:19)  Magnesium, Serum: 4.6 mg/dL (12-08-20 @ 23:31)      PAT ZAVALA is on magnesium for pre-eclampsia.     Plan  - second dose of betamethasone this afternoon  - continue mag checks q 4 hours and mag levels q6 hours  - DVT prophylaxis: SCDs

## 2020-12-09 NOTE — CHART NOTE - NSCHARTNOTEFT_GEN_A_CORE
Pt admitted as recommended by [facility] for PEC with severe features  She received steroids  She has been managed by our hospitalists up until now  I agree with all management  Our plan was to wait until tomorrow for steroid optimization however her LFTs are rising  [facility] has now recommended delivery tonight  Risks and benefits explained to her  twin gestation, desires btl and c section  malpresentation

## 2020-12-09 NOTE — OB PROVIDER LABOR PROGRESS NOTE - ASSESSMENT
36yo  at 33.3 weeks GA here for medical management of PECwSF on magnesium.   -Elevated Blood Pressure, but not within severe range  -Cat 1 tracing  -Reyna irregularly  -Continue with current management

## 2020-12-09 NOTE — OB NEONATOLOGY/PEDIATRICIAN DELIVERY SUMMARY - NSPEDSNEONOTESA_OBGYN_ALL_OB_FT
Requested by Dr Mendoza to attend this PC/S of a 34yo  mother at 33.3 weeks secondary to pre-eclampsia with severe features. Pregnancy is complicated by di/di twin gestation, nl dopplers, IUGR, pre-eclampsia, GDM diet controlled.  She is    HIV neg, HBsAg neg, RPR NR, Rubella Imm, GBS Unk.  No evidence of ROM or pre-term labor. She received Betamethasone on.10/22- and a rescue course of betamethasone was given -.  She is  receiving Mag and Cefalozolin.   L&D:  [facility] at delivery.  Baby A born   with good cry, transferred to warmer, orally suctioned, dried, and examined. Requested by Dr Mendoza to attend this PC/S of a 36yo  mother at 33.3 weeks secondary to pre-eclampsia with severe features. Pregnancy is complicated by di/di twin gestation, nl dopplers, IUGR, pre-eclampsia, GDM diet controlled.  She is B pos, HIV neg, HBsAg neg, RPR NR, Rubella Imm, GBS Unk, COVID19 neg.  No evidence of ROM or pre-term labor. She received Betamethasone on.10/22- and a rescue course of betamethasone was given -.  She is  receiving Mag and Cefalozolin.   L&D:  [facility] at delivery.  Baby A born   with good cry, transferred to warmer, orally suctioned, dried, and examined. Requested by Dr Mendoza to attend this PC/S of a 36yo  mother at 33.3 weeks secondary to pre-eclampsia with severe features. Pregnancy is complicated by di/di twin gestation, nl dopplers, IUGR, pre-eclampsia, GDM diet controlled.  She is O pos, HIV neg, HBsAg neg, RPR NR, Rubella Imm, GBS Unk, COVID19 neg.  No evidence of ROM or pre-term labor. She received Betamethasone on.10/22- and a rescue course of betamethasone was given -.  She is  receiving Mag and Cefalozolin.   L&D:  [facility] at delivery.  Baby A born   with good cry, transferred to warmer, orally suctioned, dried, and examined. Requested by Dr Mendoza to attend this PC/S of a 36yo  mother at 33.3 weeks secondary to pre-eclampsia with severe features. Pregnancy is complicated by di/di twin gestation, nl dopplers, IUGR, pre-eclampsia, GDM diet controlled.  She is O pos, HIV neg, HBsAg neg, RPR NR, Rubella Imm, GBS Unk, COVID19 neg.  No evidence of ROM or pre-term labor. She received Betamethasone on.10/22- and a rescue course of betamethasone was given -.  She is  receiving Mag and Cefalozolin.   L&D:  [facility] at delivery.  Baby A born breech with good cry, transferred to warmer, orally suctioned, dried, and examined.  Baby continued to look dusky in spite of strong cry, started to grunt and developed retractions for which CPAP 5 was initiated. O2 sat 65 at aprox 3 mins of life, FIO2 adjusted to achieve target O2 sats. APGAR Scores 8/9. Infant showed to parents and then transferred to NICU for further evaluation and management.

## 2020-12-09 NOTE — CONSULT NOTE PEDS - SUBJECTIVE AND OBJECTIVE BOX
NICU called to consult on this 36yo  mother now 33.3 weeks with admitted with.pre-eclampsia with severe features. Pregnancy is complicated by di/di twin gestation, IUGR, pre-eclampsia and maternal pruritis.  No evidence of ROM or pre-term labor. She received Betamethasone on...and receiving Mag/Indocin/Amp. Most recent EFW was ...grams (...tile). She is expecting a (female/male) infant.          I met with Ms. ... on the antepartum/L&D unit to discuss what to expect with delivery of a ...week infant.    1) The NICU team will be present at the delivery to assess and care from her infant.   2) Overall survival at ... weeks is good/poor.  3) The infant may require respiratory support, either in the form of nasal CPAP or intubation and mechanical ventilation. Use of surfactant discussed.    4)  The infant may be screened for infection and started on antibiotics at any time. If the infant's clinical status changes during NICU course, infant will be re-screened and treated for infection.   5. Feeds will likely not be started initially. The infant will receive IV fluids and IV nutrition as necessary through peripheral IVs and central lines. The infant will be at risk for hypoglycemia.    6. When feeds are initiated, it will start slowly with milk orally or through an orogastric tube. The importance of breastmilk was discussed. The infant will be monitored for feeding tolerance. Suck-swallow coordination begins at approximately 32 weeks gestation.   7. The infant will be at risk for jaundice which can be treated with phototherapy.   8. The infant is at risk for thermoregulation issues.   9. The infant may require blood transfusions due to anemia of prematurity.  10. The infant will be evaluated for retinopathy of prematurity and may be at risk for vision loss.  11. The infant will be at risk for persistent PDA which may require surgery.  12. All premature infants are at risk for developmental delays. The infant will be monitored for IVH. Will also be monitored for first two years by developmental pediatricians.       Estimated length of stay is about ... months.       Ms. ... had the opportunity to ask questions and may contact the NICU at any time if further questions arise.       Thank you for the opportunity to participate in the care of this patient and please inform us of any changes in her status.        NICU called to consult on this 34yo  mother now 33.3 weeks with admitted with.pre-eclampsia with severe features. Pregnancy is complicated by di/di twin gestation, IUGR, pre-eclampsia, GDM diet controlled.  No evidence of ROM or pre-term labor. She received Betamethasone on.10/22- and a rescue course of betamethasone was given -.  She is  receiving Mag and Cefalozolin. Most recent EFW was ...grams (...tile). She is expecting a both male infants.          I met with . on the antepartum/L&D unit to discuss what to expect with delivery of 33.3.week GA twins.    1) The NICU team will be present at the delivery to assess and care from her infant.   2) Overall survival at  33.3 weeks is good.  3) The infant may require respiratory support, either in the form of nasal CPAP or intubation and mechanical ventilation. Use of surfactant discussed.    4)  The infant may be screened for infection and started on antibiotics at any time. If the infant's clinical status changes during NICU course, infant will be re-screened and treated for infection.   5. Feeds will likely not be started initially. The infant will receive IV fluids and IV nutrition as necessary through peripheral IVs and central lines. The infant will be at risk for hypoglycemia.    6. When feeds are initiated, it will start slowly with milk orally or through an orogastric tube. The importance of breastmilk was discussed. The infant will be monitored for feeding tolerance.    7. The infant will be at risk for jaundice which can be treated with phototherapy.   8. The infant is at risk for thermoregulation issues.   9. All premature infants are at risk for developmental delays. The infant will be monitored for IVH. Will also be monitored for first two years by developmental pediatricians.       Estimated length of stay is about 2-3 weeks.        Ms. Cox had the opportunity to ask questions and may contact the NICU at any time if further questions arise.       Thank you for the opportunity to participate in the care of this patient and please inform us of any changes in her status.        NICU called to consult on this 34yo  mother now 33.3 weeks with admitted with.pre-eclampsia with severe features. Pregnancy is complicated by di/di twin gestation, nl dopplers, IUGR, pre-eclampsia, GDM diet controlled.  No evidence of ROM or pre-term labor. She received Betamethasone on.10/22- and a rescue course of betamethasone was given -.  She is  receiving Mag and Cefalozolin. Most recent EFW was ...grams (...tile). She is expecting a both male infants.          I met with . on the antepartum/L&D unit to discuss what to expect with delivery of 33.3.week GA twins.    1) The NICU team will be present at the delivery to assess and care from her infant.   2) Overall survival at  33.3 weeks is good.  3) The infant may require respiratory support, either in the form of nasal CPAP or intubation and mechanical ventilation. Use of surfactant discussed.    4)  The infant may be screened for infection and started on antibiotics at any time. If the infant's clinical status changes during NICU course, infant will be re-screened and treated for infection.   5. Feeds will likely not be started initially. The infant will receive IV fluids and IV nutrition as necessary through peripheral IVs and central lines. The infant will be at risk for hypoglycemia.    6. When feeds are initiated, it will start slowly with milk orally or through an orogastric tube. The importance of breastmilk was discussed. The infant will be monitored for feeding tolerance.    7. The infant will be at risk for jaundice which can be treated with phototherapy.   8. The infant is at risk for thermoregulation issues.   9. All premature infants are at risk for developmental delays. The infant will be monitored for IVH. Will also be monitored for first two years by developmental pediatricians.       Estimated length of stay is about 2-3 weeks.        Ms. Cox had the opportunity to ask questions and may contact the NICU at any time if further questions arise.       Thank you for the opportunity to participate in the care of this patient and please inform us of any changes in her status.        NICU called to consult on this 34yo  mother now 33.3 weeks with admitted with.pre-eclampsia with severe features. Pregnancy is complicated by di/di twin gestation, nl dopplers, IUGR, pre-eclampsia, GDM diet controlled.  No evidence of ROM or pre-term labor. She received Betamethasone on.10/22- and a rescue course of betamethasone was given -.  She is  receiving Mag and Cefalozolin. Most recent EFW was Baby A 1501g and Baby B 1485.grams  on20 . She is expecting a both male infants.          I met with . on the antepartum/L&D unit to discuss what to expect with delivery of 33.3.week GA twins.    1) The NICU team will be present at the delivery to assess and care from her infant.   2) Overall survival at  33.3 weeks is good.  3) The infant may require respiratory support, either in the form of nasal CPAP or intubation and mechanical ventilation. Use of surfactant discussed.    4)  The infant may be screened for infection and started on antibiotics at any time. If the infant's clinical status changes during NICU course, infant will be re-screened and treated for infection.   5. Feeds will likely not be started initially. The infant will receive IV fluids and IV nutrition as necessary through peripheral IVs and central lines. The infant will be at risk for hypoglycemia.    6. When feeds are initiated, it will start slowly with milk orally or through an orogastric tube. The importance of breastmilk was discussed. The infant will be monitored for feeding tolerance.    7. The infant will be at risk for jaundice which can be treated with phototherapy.   8. The infant is at risk for thermoregulation issues.   9. All premature infants are at risk for developmental delays. The infant will be monitored for IVH. Will also be monitored for first two years by developmental pediatricians.       Estimated length of stay is about 2-3 weeks.        Ms. Cox had the opportunity to ask questions and may contact the NICU at any time if further questions arise.       Thank you for the opportunity to participate in the care of this patient and please inform us of any changes in her status.

## 2020-12-09 NOTE — OB PROVIDER LABOR PROGRESS NOTE - NS_SUBJECTIVE/OBJECTIVE_OBGYN_ALL_OB_FT
Patient feeling rectal pressure.   Vital Signs Last 24 Hrs  T(C): 37.0 (08 Dec 2020 22:00), Max: 37.0 (08 Dec 2020 22:00)  T(F): 98.6 (08 Dec 2020 22:00), Max: 98.6 (08 Dec 2020 22:00)  HR: 108 (09 Dec 2020 03:29) (85 - 135)  BP: 143/81 (09 Dec 2020 03:21) (120/58 - 161/87)  RR: 18 (08 Dec 2020 22:00) (14 - 18)  SpO2: 98% (09 Dec 2020 03:34) (96% - 99%)

## 2020-12-09 NOTE — PROGRESS NOTE ADULT - ASSESSMENT
VICKI ZAVALAA is a 35y  at 33w2d who presented to L&D for hypertension at the office. She is being admitted for pre-eclampsia with severe features due to elevated LFTs.

## 2020-12-10 ENCOUNTER — RESULT REVIEW (OUTPATIENT)
Age: 35
End: 2020-12-10

## 2020-12-10 LAB
ALBUMIN SERPL ELPH-MCNC: 2.2 G/DL — LOW (ref 3.3–5.2)
ALP SERPL-CCNC: 167 U/L — HIGH (ref 40–120)
ALT FLD-CCNC: 175 U/L — HIGH
ANION GAP SERPL CALC-SCNC: 7 MMOL/L — SIGNIFICANT CHANGE UP (ref 5–17)
AST SERPL-CCNC: 148 U/L — HIGH
BASOPHILS # BLD AUTO: 0.05 K/UL — SIGNIFICANT CHANGE UP (ref 0–0.2)
BASOPHILS NFR BLD AUTO: 0.3 % — SIGNIFICANT CHANGE UP (ref 0–2)
BILIRUB SERPL-MCNC: <0.2 MG/DL — LOW (ref 0.4–2)
BUN SERPL-MCNC: 13 MG/DL — SIGNIFICANT CHANGE UP (ref 8–20)
CALCIUM SERPL-MCNC: 6.1 MG/DL — CRITICAL LOW (ref 8.6–10.2)
CHLORIDE SERPL-SCNC: 103 MMOL/L — SIGNIFICANT CHANGE UP (ref 98–107)
CO2 SERPL-SCNC: 22 MMOL/L — SIGNIFICANT CHANGE UP (ref 22–29)
CREAT SERPL-MCNC: 0.77 MG/DL — SIGNIFICANT CHANGE UP (ref 0.5–1.3)
CULTURE RESULTS: SIGNIFICANT CHANGE UP
EOSINOPHIL # BLD AUTO: 0 K/UL — SIGNIFICANT CHANGE UP (ref 0–0.5)
EOSINOPHIL NFR BLD AUTO: 0 % — SIGNIFICANT CHANGE UP (ref 0–6)
GLUCOSE BLDC GLUCOMTR-MCNC: 101 MG/DL — HIGH (ref 70–99)
GLUCOSE SERPL-MCNC: 178 MG/DL — HIGH (ref 70–99)
HBV SURFACE AG SERPL QL IA: SIGNIFICANT CHANGE UP
HCT VFR BLD CALC: 24.9 % — LOW (ref 34.5–45)
HCT VFR BLD CALC: 27.3 % — LOW (ref 34.5–45)
HGB BLD-MCNC: 8.2 G/DL — LOW (ref 11.5–15.5)
HGB BLD-MCNC: 8.9 G/DL — LOW (ref 11.5–15.5)
IMM GRANULOCYTES NFR BLD AUTO: 4.8 % — HIGH (ref 0–1.5)
LYMPHOCYTES # BLD AUTO: 1.13 K/UL — SIGNIFICANT CHANGE UP (ref 1–3.3)
LYMPHOCYTES # BLD AUTO: 5.9 % — LOW (ref 13–44)
MAGNESIUM SERPL-MCNC: 5.3 MG/DL — HIGH (ref 1.6–2.6)
MAGNESIUM SERPL-MCNC: 5.8 MG/DL — HIGH (ref 1.6–2.6)
MAGNESIUM SERPL-MCNC: 5.9 MG/DL — HIGH (ref 1.6–2.6)
MAGNESIUM SERPL-MCNC: 5.9 MG/DL — HIGH (ref 1.6–2.6)
MCHC RBC-ENTMCNC: 27.6 PG — SIGNIFICANT CHANGE UP (ref 27–34)
MCHC RBC-ENTMCNC: 27.8 PG — SIGNIFICANT CHANGE UP (ref 27–34)
MCHC RBC-ENTMCNC: 32.6 GM/DL — SIGNIFICANT CHANGE UP (ref 32–36)
MCHC RBC-ENTMCNC: 32.9 GM/DL — SIGNIFICANT CHANGE UP (ref 32–36)
MCV RBC AUTO: 84.4 FL — SIGNIFICANT CHANGE UP (ref 80–100)
MCV RBC AUTO: 84.5 FL — SIGNIFICANT CHANGE UP (ref 80–100)
MONOCYTES # BLD AUTO: 0.84 K/UL — SIGNIFICANT CHANGE UP (ref 0–0.9)
MONOCYTES NFR BLD AUTO: 4.4 % — SIGNIFICANT CHANGE UP (ref 2–14)
NEUTROPHILS # BLD AUTO: 16.13 K/UL — HIGH (ref 1.8–7.4)
NEUTROPHILS NFR BLD AUTO: 84.6 % — HIGH (ref 43–77)
PLATELET # BLD AUTO: 216 K/UL — SIGNIFICANT CHANGE UP (ref 150–400)
PLATELET # BLD AUTO: 218 K/UL — SIGNIFICANT CHANGE UP (ref 150–400)
POTASSIUM SERPL-MCNC: 4.3 MMOL/L — SIGNIFICANT CHANGE UP (ref 3.5–5.3)
POTASSIUM SERPL-SCNC: 4.3 MMOL/L — SIGNIFICANT CHANGE UP (ref 3.5–5.3)
PROT SERPL-MCNC: 4.9 G/DL — LOW (ref 6.6–8.7)
RBC # BLD: 2.95 M/UL — LOW (ref 3.8–5.2)
RBC # BLD: 3.23 M/UL — LOW (ref 3.8–5.2)
RBC # FLD: 15.5 % — HIGH (ref 10.3–14.5)
RBC # FLD: 15.7 % — HIGH (ref 10.3–14.5)
RUBV IGG SER-ACNC: 6.1 INDEX — SIGNIFICANT CHANGE UP
RUBV IGG SER-IMP: POSITIVE — SIGNIFICANT CHANGE UP
SODIUM SERPL-SCNC: 132 MMOL/L — LOW (ref 135–145)
SPECIMEN SOURCE: SIGNIFICANT CHANGE UP
WBC # BLD: 16.06 K/UL — HIGH (ref 3.8–10.5)
WBC # BLD: 19.06 K/UL — HIGH (ref 3.8–10.5)
WBC # FLD AUTO: 16.06 K/UL — HIGH (ref 3.8–10.5)
WBC # FLD AUTO: 19.06 K/UL — HIGH (ref 3.8–10.5)

## 2020-12-10 PROCEDURE — 88302 TISSUE EXAM BY PATHOLOGIST: CPT | Mod: 26

## 2020-12-10 PROCEDURE — 88307 TISSUE EXAM BY PATHOLOGIST: CPT | Mod: 26

## 2020-12-10 RX ORDER — LABETALOL HCL 100 MG
200 TABLET ORAL EVERY 8 HOURS
Refills: 0 | Status: DISCONTINUED | OUTPATIENT
Start: 2020-12-10 | End: 2020-12-10

## 2020-12-10 RX ORDER — LABETALOL HCL 100 MG
1 TABLET ORAL
Qty: 0 | Refills: 0 | DISCHARGE
Start: 2020-12-10

## 2020-12-10 RX ORDER — DIPHENHYDRAMINE HCL 50 MG
25 CAPSULE ORAL ONCE
Refills: 0 | Status: DISCONTINUED | OUTPATIENT
Start: 2020-12-10 | End: 2020-12-10

## 2020-12-10 RX ORDER — IBUPROFEN 200 MG
1 TABLET ORAL
Qty: 21 | Refills: 1
Start: 2020-12-10 | End: 2020-12-23

## 2020-12-10 RX ORDER — LABETALOL HCL 100 MG
300 TABLET ORAL EVERY 8 HOURS
Refills: 0 | Status: DISCONTINUED | OUTPATIENT
Start: 2020-12-10 | End: 2020-12-10

## 2020-12-10 RX ORDER — FERROUS SULFATE 325(65) MG
325 TABLET ORAL DAILY
Refills: 0 | Status: DISCONTINUED | OUTPATIENT
Start: 2020-12-10 | End: 2020-12-12

## 2020-12-10 RX ORDER — IBUPROFEN 200 MG
600 TABLET ORAL EVERY 6 HOURS
Refills: 0 | Status: DISCONTINUED | OUTPATIENT
Start: 2020-12-10 | End: 2020-12-12

## 2020-12-10 RX ORDER — DIPHENHYDRAMINE HCL 50 MG
25 CAPSULE ORAL ONCE
Refills: 0 | Status: COMPLETED | OUTPATIENT
Start: 2020-12-10 | End: 2020-12-10

## 2020-12-10 RX ORDER — LABETALOL HCL 100 MG
20 TABLET ORAL ONCE
Refills: 0 | Status: COMPLETED | OUTPATIENT
Start: 2020-12-10 | End: 2020-12-10

## 2020-12-10 RX ADMIN — Medication 20 MILLIGRAM(S): at 00:25

## 2020-12-10 RX ADMIN — Medication 975 MILLIGRAM(S): at 23:06

## 2020-12-10 RX ADMIN — Medication 975 MILLIGRAM(S): at 09:04

## 2020-12-10 RX ADMIN — Medication 50 GM/HR: at 13:08

## 2020-12-10 RX ADMIN — Medication 975 MILLIGRAM(S): at 22:06

## 2020-12-10 RX ADMIN — Medication 25 MILLIGRAM(S): at 00:07

## 2020-12-10 RX ADMIN — Medication 30 MILLIGRAM(S): at 06:10

## 2020-12-10 RX ADMIN — Medication 200 MILLIGRAM(S): at 00:07

## 2020-12-10 RX ADMIN — Medication 975 MILLIGRAM(S): at 16:51

## 2020-12-10 RX ADMIN — ENOXAPARIN SODIUM 40 MILLIGRAM(S): 100 INJECTION SUBCUTANEOUS at 09:05

## 2020-12-10 RX ADMIN — Medication 975 MILLIGRAM(S): at 04:44

## 2020-12-10 RX ADMIN — Medication 30 MILLIGRAM(S): at 12:34

## 2020-12-10 RX ADMIN — Medication 50 GM/HR: at 07:00

## 2020-12-10 RX ADMIN — Medication 975 MILLIGRAM(S): at 17:51

## 2020-12-10 RX ADMIN — Medication 30 MILLIGRAM(S): at 06:25

## 2020-12-10 RX ADMIN — ONDANSETRON 4 MILLIGRAM(S): 8 TABLET, FILM COATED ORAL at 10:17

## 2020-12-10 RX ADMIN — SODIUM CHLORIDE 75 MILLILITER(S): 9 INJECTION, SOLUTION INTRAVENOUS at 08:00

## 2020-12-10 RX ADMIN — Medication 325 MILLIGRAM(S): at 22:06

## 2020-12-10 RX ADMIN — Medication 975 MILLIGRAM(S): at 04:14

## 2020-12-10 RX ADMIN — Medication 30 MILLIGRAM(S): at 12:19

## 2020-12-10 NOTE — CHART NOTE - NSCHARTNOTEFT_GEN_A_CORE
Notified of severe range Blood Pressure (160/101). Patient asymptomatic. Blood Pressures cycling every 5 minutes. PO Labetalol 200mg TID started for persistently elevated BPs.

## 2020-12-10 NOTE — DISCHARGE NOTE OB - PATIENT PORTAL LINK FT
You can access the FollowMyHealth Patient Portal offered by [facility] by registering at the following website: [email or web address]. By joining [facility]’s FollowMyHealth portal, you will also be able to view your health information using other applications (apps) compatible with our system.

## 2020-12-10 NOTE — CHART NOTE - NSCHARTNOTEFT_GEN_A_CORE
Patient was seen and examined at bedside. Patient has no complaints. She denies headaches, visual disturbances, RUQ pain, epigastric pain and new onset edema.     T(C): 37 (12-10-20 @ 00:27), Max: 37.0 (12-09-20 @ 06:00)  HR: 89 (12-10-20 @ 01:38) (85 - 128)  BP: 131/71 (12-10-20 @ 01:36) (109/73 - 164/113)  RR: 18 (12-10-20 @ 00:27) (17 - 21)  SpO2: 97% (12-10-20 @ 01:38) (92% - 99%)    Gen: NAD, well-appearing   Heart: RRR  Lungs: CTAB  Ext: non-tender, non-edematous, 2+ DTRs in upper extremities bilaterally     Urine output: 200cc/hr     Magnesium, Serum (12.09.20 @ 23:41)  Magnesium, Serum: 5.3 mg/dL    Continue with current regimen.

## 2020-12-10 NOTE — DISCHARGE NOTE OB - CARE PROVIDER_API CALL
Moriah Mendoza)  Obstetrics and Gynecology  [address] [place] [address], [address] [place] [address]  Phone: [phone]  Fax: [phone]  Established Patient  Follow Up Time: 1 week

## 2020-12-10 NOTE — DISCHARGE NOTE OB - CARE PLAN
Principal Discharge DX:	Pre-eclampsia, severe, antepartum, third trimester  Goal:	pain free  Assessment and plan of treatment:	follow up in ONE week for a BP check

## 2020-12-10 NOTE — CHART NOTE - NSCHARTNOTEFT_GEN_A_CORE
S: POD 1 from primary  section and bilateral salpingectomy on magnesium for pre-eclampsia.     Patient states she feels well overall. Tolerating diet. She reports feeling dizziness, but states that she was feeling it before the section and that it started when the magnesium was started. No headaches or RUQ pain. +flatus    O: T(C): 36.9 (12-10-20 @ 17:45), Max: 37 (20 @ 23:00)  HR: 89 (12-10-20 @ 17:45) (81 - 126)  BP: 108/69 (12-10-20 @ 17:45) (97/67 - 164/113)  RR: 18 (12-10-20 @ 17:45) (16 - 21)  SpO2: 96% (12-10-20 @ 17:45) (92% - 99%)    20 @ 07:01  -  12-10-20 @ 07:00  --------------------------------------------------------  IN: 4800 mL / OUT: 4800 mL / NET: 0 mL    12-10-20 @ 07:01  -  12-10-20 @ 18:06  --------------------------------------------------------  IN: 1250 mL / OUT: 1850 mL / NET: -600 mL      Magnesium, Serum: 5.9 mg/dL (12-10-20 @ 17:02)  Magnesium, Serum: 5.9 mg/dL (12-10-20 @ 12:11)  Magnesium, Serum: 5.8 mg/dL (12-10-20 @ 06:21)  Magnesium, Serum: 5.3 mg/dL (20 @ 23:41)                8.2                  132  | 22.0 | 13.0         16.06 >-----------< 216     ------------------------< 178                   24.9                 4.3  | 103  | 0.77                                         Ca 6.1   Mg 5.9   Ph x      Aspartate Aminotransferase (AST/SGOT): 148 U/L (12-10-20 @ 17:02)  Alanine Aminotransferase (ALT/SGPT): 175 U/L (12-10-20 @ 17:02)    PE:   Gen: Well appearing, NAD  Pulm: CTABL  Abd:soft, appropriately tender, + BS  Ext: 2+ reflexes, no edema     A/P: 35y yo POD #1 here on magnesium for pre-eclampsia. She is showing no signs of magnesium toxicity and is making adequate UOP. She had a drop in her Hg. Discussed with patient that if she continue to be dizzy after magensium was shut off she might need to get blood transfusion. Will continue magnesium until 20:44 hrs tonight.       discussed with Dr. Cunningham

## 2020-12-10 NOTE — DISCHARGE NOTE OB - MEDICATION SUMMARY - MEDICATIONS TO TAKE
I will START or STAY ON the medications listed below when I get home from the hospital:    oxycodone-acetaminophen 5 mg-325 mg oral tablet  -- 1 tab(s) by mouth every 6 hours MDD:2  -- Caution federal law prohibits the transfer of this drug to any person other  than the person for whom it was prescribed.  May cause drowsiness.  Alcohol may intensify this effect.  Use care when operating dangerous machinery.  This prescription cannot be refilled.  This product contains acetaminophen.  Do not use  with any other product containing acetaminophen to prevent possible liver damage.  Using more of this medication than prescribed may cause serious breathing problems.    -- Indication: For Pain    ibuprofen 600 mg oral tablet  -- 1 tab(s) by mouth every 8 hours  -- Do not take this drug if you are pregnant.  It is very important that you take or use this exactly as directed.  Do not skip doses or discontinue unless directed by your doctor.  May cause drowsiness or dizziness.  Obtain medical advice before taking any non-prescription drugs as some may affect the action of this medication.  Take with food or milk.    -- Indication: For Pain    labetalol 200 mg oral tablet  -- 1 tab(s) by mouth every 8 hours  -- Indication: For BP control    Prenatal 1 oral capsule  -- 1 tab(s) by mouth once a day  -- Indication: For Nutritional   I will START or STAY ON the medications listed below when I get home from the hospital:    oxycodone-acetaminophen 5 mg-325 mg oral tablet  -- 1 tab(s) by mouth every 6 hours MDD:2  -- Caution federal law prohibits the transfer of this drug to any person other  than the person for whom it was prescribed.  May cause drowsiness.  Alcohol may intensify this effect.  Use care when operating dangerous machinery.  This prescription cannot be refilled.  This product contains acetaminophen.  Do not use  with any other product containing acetaminophen to prevent possible liver damage.  Using more of this medication than prescribed may cause serious breathing problems.    -- Indication: For Pain    ibuprofen 600 mg oral tablet  -- 1 tab(s) by mouth every 8 hours  -- Do not take this drug if you are pregnant.  It is very important that you take or use this exactly as directed.  Do not skip doses or discontinue unless directed by your doctor.  May cause drowsiness or dizziness.  Obtain medical advice before taking any non-prescription drugs as some may affect the action of this medication.  Take with food or milk.    -- Indication: For Pain    Prenatal 1 oral capsule  -- 1 tab(s) by mouth once a day  -- Indication: For Nutritional   I will START or STAY ON the medications listed below when I get home from the hospital:    oxycodone-acetaminophen 5 mg-325 mg oral tablet  -- 1 tab(s) by mouth every 6 hours MDD:2  -- Caution federal law prohibits the transfer of this drug to any person other  than the person for whom it was prescribed.  May cause drowsiness.  Alcohol may intensify this effect.  Use care when operating dangerous machinery.  This prescription cannot be refilled.  This product contains acetaminophen.  Do not use  with any other product containing acetaminophen to prevent possible liver damage.  Using more of this medication than prescribed may cause serious breathing problems.    -- Indication: For Pain    ibuprofen 600 mg oral tablet  -- 1 tab(s) by mouth every 8 hours  -- Do not take this drug if you are pregnant.  It is very important that you take or use this exactly as directed.  Do not skip doses or discontinue unless directed by your doctor.  May cause drowsiness or dizziness.  Obtain medical advice before taking any non-prescription drugs as some may affect the action of this medication.  Take with food or milk.    -- Indication: For Pain    ferrous sulfate 325 mg (65 mg elemental iron) oral tablet  -- 1 tab(s) by mouth once a day   -- Check with your doctor before becoming pregnant.  Do not chew, break, or crush.  May discolor urine or feces.    -- Indication: For anemia    Prenatal 1 oral capsule  -- 1 tab(s) by mouth once a day  -- Indication: For Nutritional    Vitamin C 250 mg oral tablet  -- 1 tab(s) by mouth once a day   -- Indication: For vitamin

## 2020-12-10 NOTE — CHART NOTE - NSCHARTNOTEFT_GEN_A_CORE
/101 @ 00:05  /113 @ 00:20    Will push IV labetalol 20    Will increase labetalol PO TID to 300mg    D/W Dr. Peterson

## 2020-12-10 NOTE — PROVIDER CONTACT NOTE (OTHER) - RECOMMENDATIONS
k2gobzde blood pressure surveillance as per [facility] Hypertensive Protocol and possible IVP medication management.

## 2020-12-10 NOTE — PROVIDER CONTACT NOTE (OTHER) - BACKGROUND
s/p primary  section for PEC with SF. Patient denies HA, visual disurbances, and upper epigastric pain. Patient denies pain.  Patient c/o itchiness. Severe range blood pressure readings.

## 2020-12-10 NOTE — DISCHARGE NOTE OB - HOSPITAL COURSE
She presented for an unscheduled  section due to severe preeclampsia.  She also had a bilateral salpingectomy performed at the time of the  section.  She had an uncomplicated surgery and postpartum course. She was discharged home in stable condition.

## 2020-12-10 NOTE — CHART NOTE - NSCHARTNOTEFT_GEN_A_CORE
Patient seen and examined, no complaints - pain controlled  Denies HA/vision changes/RUQ pain   VS and labs reviewed    ICU Vital Signs Last 24 Hrs  T(C): 36.9 (10 Dec 2020 17:45), Max: 37 (09 Dec 2020 23:00)  T(F): 98.4 (10 Dec 2020 17:45), Max: 98.6 (09 Dec 2020 23:00)  HR: 89 (10 Dec 2020 17:45) (81 - 126)  BP: 97//69   RR: 18 (10 Dec 2020 17:45) (16 - 21)  SpO2: 96% (10 Dec 2020 17:45) (92% - 99%)    Resp: lungs CTA bilaterally   Abdomen: soft ND no rebound/guarding, incision c/d/i   Lower ext: no calf tenderness, +1DTRs trace pitting edema                          8.2    16.06 )-----------( 216      ( 10 Dec 2020 17:02 )             24.9   12-10    132<L>  |  103  |  13.0  ----------------------------<  178<H>  4.3   |  22.0  |  0.77      Mg     5.9     12-10    TPro  4.9<L>  /  Alb  2.2<L>  /  TBili  <0.2<L>  /  DBili  x   /  AST  148<H>  /  ALT  175<H>  /  AlkPhos  167<H>  12-10    POD#1 s/p primary  section for preeclampsia with severe features - stable   on magnesium - BPs controlled on labetalol - may need to decrease PO dose, urine output adequate  d/c caraballo when mag discontinued  encourage OOB, incentive spirometry  routine postop care  DVT ppx: SCDs until ambulating, mode Cunningham MD

## 2020-12-10 NOTE — PROVIDER CONTACT NOTE (OTHER) - SITUATION
L&D recovery pt on magnesium sulfate infusion 2g/hr @ 50mL/hr for PEC with SF.  Pt's elevated blood pressures have entered the severe range (160/101).  [facility] Hypertensive Protocol initiated

## 2020-12-11 ENCOUNTER — APPOINTMENT (OUTPATIENT)
Dept: ANTEPARTUM | Facility: CLINIC | Age: 35
End: 2020-12-11

## 2020-12-11 DIAGNOSIS — O99.213 OBESITY COMPLICATING PREGNANCY, THIRD TRIMESTER: ICD-10-CM

## 2020-12-11 LAB
BILE AC FLD-MCNC: 44.3 UMOL/L — HIGH (ref 0–10)
SARS-COV-2 IGG SERPL QL IA: NEGATIVE — SIGNIFICANT CHANGE UP
SARS-COV-2 IGM SERPL IA-ACNC: 0.14 INDEX — SIGNIFICANT CHANGE UP

## 2020-12-11 RX ORDER — ASCORBIC ACID 60 MG
1 TABLET,CHEWABLE ORAL
Qty: 7 | Refills: 0
Start: 2020-12-11 | End: 2020-12-17

## 2020-12-11 RX ORDER — FERROUS SULFATE 325(65) MG
1 TABLET ORAL
Qty: 7 | Refills: 0
Start: 2020-12-11 | End: 2020-12-17

## 2020-12-11 RX ADMIN — Medication 975 MILLIGRAM(S): at 15:35

## 2020-12-11 RX ADMIN — Medication 975 MILLIGRAM(S): at 08:57

## 2020-12-11 RX ADMIN — Medication 975 MILLIGRAM(S): at 14:26

## 2020-12-11 RX ADMIN — Medication 600 MILLIGRAM(S): at 12:15

## 2020-12-11 RX ADMIN — Medication 600 MILLIGRAM(S): at 11:45

## 2020-12-11 RX ADMIN — Medication 975 MILLIGRAM(S): at 09:15

## 2020-12-11 RX ADMIN — Medication 975 MILLIGRAM(S): at 22:25

## 2020-12-11 RX ADMIN — Medication 600 MILLIGRAM(S): at 06:00

## 2020-12-11 RX ADMIN — Medication 600 MILLIGRAM(S): at 00:13

## 2020-12-11 RX ADMIN — Medication 600 MILLIGRAM(S): at 01:10

## 2020-12-11 RX ADMIN — MAGNESIUM HYDROXIDE 30 MILLILITER(S): 400 TABLET, CHEWABLE ORAL at 11:45

## 2020-12-11 RX ADMIN — Medication 975 MILLIGRAM(S): at 04:00

## 2020-12-11 RX ADMIN — ENOXAPARIN SODIUM 40 MILLIGRAM(S): 100 INJECTION SUBCUTANEOUS at 11:45

## 2020-12-11 RX ADMIN — Medication 600 MILLIGRAM(S): at 07:00

## 2020-12-11 RX ADMIN — Medication 600 MILLIGRAM(S): at 19:00

## 2020-12-11 RX ADMIN — Medication 325 MILLIGRAM(S): at 11:45

## 2020-12-11 RX ADMIN — Medication 975 MILLIGRAM(S): at 21:25

## 2020-12-11 RX ADMIN — Medication 975 MILLIGRAM(S): at 03:06

## 2020-12-11 RX ADMIN — Medication 600 MILLIGRAM(S): at 18:06

## 2020-12-11 NOTE — PROGRESS NOTE ADULT - SUBJECTIVE AND OBJECTIVE BOX
Name: PAT ZAVALA  MRN: [id number]  Date Admitted: 20  Location: [facility] 2EST 2012 ([facility] 2EST)  Attending: Moriah Mendoza      Post Partum Note:     PAT ZAVALA is a 35y  s/p primary  section at 33w3d POD #2 due to PECwSF s/p MgSO4. Di/Di twin pregnancy complicated by IUGR and GDMA1. Viable male infants in the NICU.    SUBJECTIVE:  No acute events overnight. Pain is well controlled with PRN pain medication. No problems with ambulating or PO intake. Mutlani out, TOV pending for 11am, has the urge to void. Has had flatus but no BM. Denies N/V. Patient is having normal lochia which is decreasing.    She is bottle feeding.    OBJECTIVE:    Vital Signs Last 24 Hrs  T(C): 36.9 (11 Dec 2020 04:34), Max: 36.9 (10 Dec 2020 17:45)  T(F): 98.4 (11 Dec 2020 04:34), Max: 98.4 (10 Dec 2020 17:45)  HR: 89 (11 Dec 2020 04:34) (81 - 89)  BP: 105/63 (11 Dec 2020 04:34) (97/67 - 111/65)  RR: 18 (11 Dec 2020 04:34) (16 - 18)  SpO2: 99% (11 Dec 2020 04:34) (95% - 99%)    Physical exam:  General: AOx3, NAD.  Heart: RRR. S1S2.  Lungs: CTABL. Good airflow bilaterally.   Abdomen: +BS, Soft, appropriately tender, nondistended, no guarding or rebound tenderness, firm uterine fundus at umbilicus, the incision is clean dry and intact with steri-strips. No erythema or discharge.  Ext: No DVT signs, warm extremities.        LABS:                        8.2    16.06 )-----------( 216      ( 10 Dec 2020 17:02 )             24.9

## 2020-12-11 NOTE — PROGRESS NOTE ADULT - ASSESSMENT
PAT ZAVALA is a 35y  s/p primary  section at 33w3d POD #2 due to PECwSF s/p MgSO4. Di/Di twin pregnancy complicated by IUGR and GDMA1. Viable male infants in the NICU.  - Pain controlled on current medications  - Tolerating po   - + flatus  - (-) void: TOV at 11am  - hgb 10.9--> 8.9   --> 8.2 (po iron started)  - Lovenox for DVT prophylaxis   - Rh +  - Pt with male infants; declines circumcision  - Dispo: Continue post op care PAT ZAVALA is a 35y  s/p primary  section at 33w3d POD #2 due to PECwSF s/p MgSO4. Di/Di twin pregnancy complicated by IUGR and GDMA1. Viable male infants in the NICU.  - Pain controlled on current medications  - Tolerating po   - + flatus  - (-) void: TOV at 11am  - hgb 10.9--> 8.9   --> 8.2 (po iron started)  - Lovenox for DVT prophylaxis   - Rh +  - Pt with male infants; declines circumcision  - Dispo: Continue post op care    I have read and agree with everything in the above note.     Nisha Ceuvas- PGY4

## 2020-12-12 VITALS — HEART RATE: 97 BPM | SYSTOLIC BLOOD PRESSURE: 127 MMHG | DIASTOLIC BLOOD PRESSURE: 82 MMHG

## 2020-12-12 PROCEDURE — 81001 URINALYSIS AUTO W/SCOPE: CPT

## 2020-12-12 PROCEDURE — G0463: CPT

## 2020-12-12 PROCEDURE — 36415 COLL VENOUS BLD VENIPUNCTURE: CPT

## 2020-12-12 PROCEDURE — 88302 TISSUE EXAM BY PATHOLOGIST: CPT

## 2020-12-12 PROCEDURE — 86703 HIV-1/HIV-2 1 RESULT ANTBDY: CPT

## 2020-12-12 PROCEDURE — 85025 COMPLETE CBC W/AUTO DIFF WBC: CPT

## 2020-12-12 PROCEDURE — 86850 RBC ANTIBODY SCREEN: CPT

## 2020-12-12 PROCEDURE — 84156 ASSAY OF PROTEIN URINE: CPT

## 2020-12-12 PROCEDURE — 86769 SARS-COV-2 COVID-19 ANTIBODY: CPT

## 2020-12-12 PROCEDURE — 82239 BILE ACIDS TOTAL: CPT

## 2020-12-12 PROCEDURE — 80053 COMPREHEN METABOLIC PANEL: CPT

## 2020-12-12 PROCEDURE — 87389 HIV-1 AG W/HIV-1&-2 AB AG IA: CPT

## 2020-12-12 PROCEDURE — 86780 TREPONEMA PALLIDUM: CPT

## 2020-12-12 PROCEDURE — T1013: CPT

## 2020-12-12 PROCEDURE — 86901 BLOOD TYPING SEROLOGIC RH(D): CPT

## 2020-12-12 PROCEDURE — 85384 FIBRINOGEN ACTIVITY: CPT

## 2020-12-12 PROCEDURE — 86900 BLOOD TYPING SEROLOGIC ABO: CPT

## 2020-12-12 PROCEDURE — 83735 ASSAY OF MAGNESIUM: CPT

## 2020-12-12 PROCEDURE — 85610 PROTHROMBIN TIME: CPT

## 2020-12-12 PROCEDURE — 85730 THROMBOPLASTIN TIME PARTIAL: CPT

## 2020-12-12 PROCEDURE — 88307 TISSUE EXAM BY PATHOLOGIST: CPT

## 2020-12-12 PROCEDURE — U0003: CPT

## 2020-12-12 PROCEDURE — 86762 RUBELLA ANTIBODY: CPT

## 2020-12-12 PROCEDURE — 82962 GLUCOSE BLOOD TEST: CPT

## 2020-12-12 PROCEDURE — 59050 FETAL MONITOR W/REPORT: CPT

## 2020-12-12 PROCEDURE — 90715 TDAP VACCINE 7 YRS/> IM: CPT

## 2020-12-12 PROCEDURE — 59025 FETAL NON-STRESS TEST: CPT

## 2020-12-12 PROCEDURE — 84550 ASSAY OF BLOOD/URIC ACID: CPT

## 2020-12-12 PROCEDURE — 83615 LACTATE (LD) (LDH) ENZYME: CPT

## 2020-12-12 PROCEDURE — 82570 ASSAY OF URINE CREATININE: CPT

## 2020-12-12 PROCEDURE — 85027 COMPLETE CBC AUTOMATED: CPT

## 2020-12-12 PROCEDURE — 87070 CULTURE OTHR SPECIMN AEROBIC: CPT

## 2020-12-12 PROCEDURE — 87340 HEPATITIS B SURFACE AG IA: CPT

## 2020-12-12 RX ORDER — LABETALOL HCL 100 MG
200 TABLET ORAL EVERY 8 HOURS
Refills: 0 | Status: DISCONTINUED | OUTPATIENT
Start: 2020-12-12 | End: 2020-12-12

## 2020-12-12 RX ORDER — LABETALOL HCL 100 MG
1 TABLET ORAL
Qty: 30 | Refills: 0
Start: 2020-12-12

## 2020-12-12 RX ORDER — TETANUS TOXOID, REDUCED DIPHTHERIA TOXOID AND ACELLULAR PERTUSSIS VACCINE, ADSORBED 5; 2.5; 8; 8; 2.5 [IU]/.5ML; [IU]/.5ML; UG/.5ML; UG/.5ML; UG/.5ML
0.5 SUSPENSION INTRAMUSCULAR ONCE
Refills: 0 | Status: COMPLETED | OUTPATIENT
Start: 2020-12-12 | End: 2020-12-12

## 2020-12-12 RX ADMIN — Medication 975 MILLIGRAM(S): at 03:49

## 2020-12-12 RX ADMIN — Medication 975 MILLIGRAM(S): at 04:50

## 2020-12-12 RX ADMIN — TETANUS TOXOID, REDUCED DIPHTHERIA TOXOID AND ACELLULAR PERTUSSIS VACCINE, ADSORBED 0.5 MILLILITER(S): 5; 2.5; 8; 8; 2.5 SUSPENSION INTRAMUSCULAR at 04:23

## 2020-12-12 RX ADMIN — Medication 975 MILLIGRAM(S): at 08:34

## 2020-12-12 RX ADMIN — Medication 975 MILLIGRAM(S): at 09:00

## 2020-12-12 RX ADMIN — Medication 600 MILLIGRAM(S): at 01:10

## 2020-12-12 RX ADMIN — Medication 600 MILLIGRAM(S): at 00:12

## 2020-12-12 RX ADMIN — Medication 200 MILLIGRAM(S): at 08:56

## 2020-12-12 NOTE — PROGRESS NOTE ADULT - SUBJECTIVE AND OBJECTIVE BOX
PAT ZAVALA is a 35y  now POD#3 s/p primary  section and bilateral salpingectomy secondary to di/di twin gestation at 33.3 weeks gestation significant for PECwSF s/p MgSO4.     S:    The patient has no complaints.   Pain is controlled with current treatment regimen.   She is ambulating without difficulty and tolerating PO.   +flatus/-BM/+ voiding   She endorses appropriate lochia, which is decreasing.   Patient denies lightheadedness, dizziness, palpitations, shortness of breath and chest pain.   Denies headaches, visual disturbances, RUQ pain, epigastric pain and new-onset edema.     O:    T(C): 36.9 (20 @ 03:45), Max: 37 (20 @ 17:34)  HR: 90 (20 @ 03:45) (79 - 97)  BP: 133/86 (20 @ 03:45) (112/74 - 133/86)  RR: 18 (20 @ 03:45) (16 - 18)  SpO2: 98% (20 @ 03:45) (97% - 100%)    Gen: NAD, AOx3  CV: RRR  Pulm: CTAB  Breast: Nontender, not engorged   Abdomen:  Soft, non-tender, non-distended, +bowel sounds.  Uterus:  Fundus firm below umbilicus  Incision:  Clean/dry/intact with steri-strips in place  VE:  +Lochia  Ext:  Non-tender, non-edematous                           8.2    16.06 )-----------( 216      ( 10 Dec 2020 17:02 )             24.9     12-10    132<L>  |  103  |  13.0  ----------------------------<  178<H>  4.3   |  22.0  |  0.77    Ca    6.1<LL>      10 Dec 2020 17:02  Mg     5.9     12-10    TPro  4.9<L>  /  Alb  2.2<L>  /  TBili  <0.2<L>  /  DBili  x   /  AST  148<H>  /  ALT  175<H>  /  AlkPhos  167<H>  12-10

## 2020-12-12 NOTE — PROGRESS NOTE ADULT - ASSESSMENT
A/P: 35y  now POD#3 s/p primary  section and bilateral salpingectomy secondary to di/di twin gestation at 33.3 weeks gestation significant for PECwSF s/p MgSO4.    -Vital Signs Stable  -Voiding, tolerating PO, bowel function nml   -Heme: Hgb 10.9 --> 8.9 --> 8.2, asymptomatic and on PO Iron   -Advance care as tolerated   -Continue routine postpartum/postoperative care and education  -Male infants in NICU, declines circumcision.  -Dispo: Pt is stable for discharge to home pending attending approval.

## 2020-12-13 ENCOUNTER — NON-APPOINTMENT (OUTPATIENT)
Age: 35
End: 2020-12-13

## 2020-12-14 ENCOUNTER — NON-APPOINTMENT (OUTPATIENT)
Age: 35
End: 2020-12-14

## 2020-12-14 RX ORDER — PNV NO.118/IRON FUMARATE/FA 29 MG-1 MG
TABLET,CHEWABLE ORAL
Qty: 30 | Refills: 0 | Status: ACTIVE | COMMUNITY
Start: 2020-11-17

## 2020-12-14 RX ORDER — BLOOD PRESSURE TEST KIT-MEDIUM
KIT MISCELLANEOUS
Qty: 1 | Refills: 0 | Status: ACTIVE | COMMUNITY
Start: 2020-10-30

## 2020-12-15 ENCOUNTER — APPOINTMENT (OUTPATIENT)
Dept: ANTEPARTUM | Facility: CLINIC | Age: 35
End: 2020-12-15

## 2020-12-15 ENCOUNTER — NON-APPOINTMENT (OUTPATIENT)
Age: 35
End: 2020-12-15

## 2020-12-15 ENCOUNTER — APPOINTMENT (OUTPATIENT)
Dept: MATERNAL FETAL MEDICINE | Facility: CLINIC | Age: 35
End: 2020-12-15

## 2020-12-18 ENCOUNTER — APPOINTMENT (OUTPATIENT)
Dept: ANTEPARTUM | Facility: CLINIC | Age: 35
End: 2020-12-18

## 2020-12-18 LAB — SURGICAL PATHOLOGY STUDY: SIGNIFICANT CHANGE UP

## 2020-12-22 ENCOUNTER — APPOINTMENT (OUTPATIENT)
Dept: ANTEPARTUM | Facility: CLINIC | Age: 35
End: 2020-12-22

## 2020-12-23 ENCOUNTER — NON-APPOINTMENT (OUTPATIENT)
Age: 35
End: 2020-12-23

## 2020-12-29 ENCOUNTER — APPOINTMENT (OUTPATIENT)
Dept: ANTEPARTUM | Facility: CLINIC | Age: 35
End: 2020-12-29

## 2021-01-05 ENCOUNTER — APPOINTMENT (OUTPATIENT)
Dept: ANTEPARTUM | Facility: CLINIC | Age: 36
End: 2021-01-05

## 2021-01-06 ENCOUNTER — NON-APPOINTMENT (OUTPATIENT)
Age: 36
End: 2021-01-06

## 2021-01-11 ENCOUNTER — NON-APPOINTMENT (OUTPATIENT)
Age: 36
End: 2021-01-11

## 2021-05-26 NOTE — FAMILY HISTORY
never used [Age 35+ During Pregnancy] : not 35 or over during pregnancy [Reported Family History Of Birth Defects] : no congenital heart defects [Alfred-Sachs Carrier] : no Alfred-Sachs [Family History] : no mental retardation/autism [Reported Family History Of Genetic Disease] : no history of child defect in child of baby father

## 2024-03-18 NOTE — PROVIDER CONTACT NOTE (OTHER) - ACTION/TREATMENT ORDERED:
To continue to monitor blood pressure readings q 5 minutes as per protocol. Normal vision: sees adequately in most situations; can see medication labels, newsprint

## 2024-09-06 ENCOUNTER — APPOINTMENT (OUTPATIENT)
Age: 39
End: 2024-09-06

## 2024-09-06 VITALS — BODY MASS INDEX: 38.87 KG/M2 | HEIGHT: 60 IN | WEIGHT: 198 LBS

## 2024-09-06 DIAGNOSIS — B07.0 PLANTAR WART: ICD-10-CM

## 2024-09-06 DIAGNOSIS — M79.671 PAIN IN RIGHT FOOT: ICD-10-CM

## 2024-09-06 PROCEDURE — 99203 OFFICE O/P NEW LOW 30 MIN: CPT | Mod: 25

## 2024-09-06 PROCEDURE — 17110 DESTRUCTION B9 LES UP TO 14: CPT

## 2024-09-06 RX ORDER — SALICYLIC ACID 17 %
17 LIQUID (ML) TOPICAL TWICE DAILY
Qty: 9 | Refills: 2 | Status: ACTIVE | COMMUNITY
Start: 2024-09-06 | End: 1900-01-01

## 2024-09-06 NOTE — ASSESSMENT
[FreeTextEntry1] : Discussed diagnosis and treatment with patient  Discussed etiology of symptoms patient is experiencing  Discussed all non-surgical and surgical treatments  Discussed RICE with patient for pain control  Verbal consent was obtained from patient  Skin prepped with alcohol Debridement of lesion x2 right foot down to and including the epidermis with sterile 15 blade.  Punctate bleeding controlled with manual compression.  Silver nitrate applied  SSD and a dry bandage applied to the lesion Rx OTC Salicylic acid topical to be applied daily to the lesion and keep covered with bandaid.   Patient to return to the office in 2 weeks

## 2024-09-06 NOTE — PHYSICAL EXAM
[General Appearance - Alert] : alert [General Appearance - In No Acute Distress] : in no acute distress [Ankle Swelling (On Exam)] : not present [Varicose Veins Of Lower Extremities] : not present [] : not present [Delayed in the Right Toes] : capillary refills normal in right toes [Delayed in the Left Toes] : capillary refills normal in the left toes [No Joint Swelling] : no joint swelling [de-identified] : pain on palpation to right plantar central and plantar heel due to the lesions [FreeTextEntry1] : Epithelial hyperplasia lesion noted Right plantar central and plantar heel.  Skin lines reduced within the lesion.  Pain on medial and lateral pressure.  After debridement punctate bleeding noted with in lesion.  [Sensation] : the sensory exam was normal to light touch and pinprick [No Focal Deficits] : no focal deficits [Deep Tendon Reflexes (DTR)] : deep tendon reflexes were 2+ and symmetric [Motor Exam] : the motor exam was normal [Oriented To Time, Place, And Person] : oriented to person, place, and time [Impaired Insight] : insight and judgment were intact [Affect] : the affect was normal

## 2024-09-23 ENCOUNTER — APPOINTMENT (OUTPATIENT)
Age: 39
End: 2024-09-23
Payer: COMMERCIAL

## 2024-09-23 DIAGNOSIS — B07.0 PLANTAR WART: ICD-10-CM

## 2024-09-23 DIAGNOSIS — M79.671 PAIN IN RIGHT FOOT: ICD-10-CM

## 2024-09-23 PROCEDURE — 17110 DESTRUCTION B9 LES UP TO 14: CPT | Mod: RT

## 2024-09-23 PROCEDURE — 99213 OFFICE O/P EST LOW 20 MIN: CPT | Mod: 25

## 2024-09-23 NOTE — ASSESSMENT
[FreeTextEntry1] : Discussed diagnosis and treatment with patient  Discussed etiology of symptoms patient is experiencing  Discussed all non-surgical and surgical treatments  Discussed RICE with patient for pain control  Verbal consent was obtained from patient  Skin prepped with alcohol and administered 1% lidocaine plain to both areas of right foot Debridement of lesion x2 right foot down to and including the epidermis with sterile 15 blade.  Punctate bleeding controlled with manual compression.  Silver nitrate applied  SSD and a dry bandage applied to the lesion Continue OTC Salicylic acid topical to be applied daily to the lesion and keep covered with bandaid.   Patient to return to the office in 2 weeks

## 2024-09-23 NOTE — HISTORY OF PRESENT ILLNESS
[FreeTextEntry1] : Patient presents for wart follow up.  Patient states she has pain when walking. Patient has been applying compound W.

## 2024-09-24 ENCOUNTER — APPOINTMENT (OUTPATIENT)
Age: 39
End: 2024-09-24

## 2024-10-07 ENCOUNTER — APPOINTMENT (OUTPATIENT)
Age: 39
End: 2024-10-07

## 2025-04-15 NOTE — PHYSICAL EXAM
Someone should call you to schedule with ortho about your right elbow      Medicare Wellness Visit  Plan for Preventive Care    A good way for you to stay healthy is to use preventive care.  Medicare covers many services that can help you stay healthy.* The goal of these services is to find any health problems as quickly as possible. Finding problems early can help make them easier to treat.  Your personal plan below lists the services you may need and when they are due.      Health Maintenance Summary       COVID-19 Vaccine (4 - 2024-25 season)  Overdue since 9/1/2024    Pneumococcal Vaccine 50+ (1 of 1 - PCV)  Never done    Respiratory Syncytial Virus (RSV) Vaccine 60+ (1 - Risk 60-74 years 1-dose series)  Never done    Medicare Advantage- Medicare Wellness Visit (Yearly - January to December)  Scheduled for 4/15/2025    DTaP/Tdap/Td Vaccine (2 - Td or Tdap)  Postponed until 4/18/2025    Shingles Vaccine (1 of 2)  Postponed until 4/18/2025    Influenza Vaccine (Season Ended)  Next due on 9/1/2025    Depression Screening (Yearly)  Next due on 4/10/2026    Colorectal Cancer Screening (Colonoscopy - Every 10 Years)  Next due on 5/2/2026    Hepatitis C Screening   Completed    Hepatitis A Vaccine   Aged Out    Meningococcal Vaccine   Aged Out    Hepatitis B Vaccine (For Physician/APC Discussion)   Aged Out    Meningococcal Serogroup B Vaccine   Aged Out    HPV Vaccine   Aged Out             Preventive Care for Women and Men    Heart Screenings (Cardiovascular):  Blood tests are used to check your cholesterol, lipid and triglyceride levels. High levels can increase your risk for heart disease and stroke. High levels can be treated with medications, diet and exercise. Lowering your levels can help keep your heart and blood vessels healthy.  Your provider will order these tests if they are needed.    An ultrasound is done to see if you have an abdominal aortic aneurysm (AAA).  This is an enlargement of one of the main  blood vessels that delivers blood to the body.   In the [place], 9,000 deaths are caused by AAA.  You may not even know you have this problem and as many as 1 in 3 people will have a serious problem if it is not treated.  Early diagnosis allows for more effective treatment and cure.  If you have a family history of AAA or are a male age 65-75 who has smoked, you are at higher risk of an AAA.  Your provider can order this test, if needed.    Colorectal Screening:  There are many tests that are used to check for cancer of your colon and rectum. You and your provider should discuss what test is best for you and when to have it done.  Options include:  Screening Colonoscopy: exam of the entire colon, seen through a flexible lighted tube.  Flexible Sigmoidoscopy: exam of the last third (sigmoid portion) of the colon and rectum, seen through a flexible lighted tube.  Cologuard DNA stool test: a sample of your stool is used to screen for cancer and unseen blood in your stool.  Fecal Occult Blood Test: a sample of your stool is studied to find any unseen blood    Flu Shot:  An immunization that helps to prevent influenza (the flu). You should get this every year. The best time to get the shot is in the fall.    Pneumococcal Shot:  Vaccines help prevent pneumococcal disease, which is any type of illness caused by Streptococcus pneumoniae bacteria. There are two kinds of pneumococcal vaccines available in the [place]:   Pneumococcal conjugate vaccines (PCV20 or Tbautkr34®)  Pneumococcal polysaccharide vaccine (PPSV23 or [id number]®)  For those who have never received any pneumococcal conjugate vaccine, CDC recommends PVC20 for adults 65 years or older and adults 19 through 64 years old with certain medical conditions or risk factors.   For those who have previously received PCV13, this should be followed by a dose of PPSV23.     Hepatitis B Shot:  An immunization that helps to protect people from getting  Hepatitis B. Hepatitis B is a virus that spreads through contact with infected blood or body fluids. Many people with the virus do not have symptoms.  The virus can lead to serious problems, such as liver disease. Some people are at higher risk than others. Your doctor will tell you if you need this shot.     Diabetes Screening:  A test to measure sugar (glucose) in your blood is called a fasting blood sugar. Fasting means you cannot have food or drink for at least 8 hours before the test. This test can detect diabetes long before you may notice symptoms.    Glaucoma Screening:  Glaucoma screening is performed by your eye doctor. The test measures the fluid pressure inside your eyes to determine if you have glaucoma.     Hepatitis C Screening:  A blood test to see if you have the hepatitis C virus.  Hepatitis C attacks the liver and is a major cause of chronic liver disease.  Medicare will cover a single screening for all adults born between 1945 & 1965, or high risk patients (people who have injected illegal drugs or people who have had blood transfusions).  High risk patients who continue to inject illegal drugs can be screened for Hepatitis C every year.    Smoking and Tobacco-Use Cessation Counseling:  Tobacco is the single greatest cause of disease and early death in our country today. Medication and counseling together can increase a person’s chance of quitting for good.   Medicare covers two quitting attempts per year, with four counseling sessions per attempt (eight sessions in a 12 month period)    Preventive Screening tests for Women    Screening Mammograms and Breast Exams:  An x-ray of your breasts to check for breast cancer before you or your doctor may be able to feel it.  If breast cancer is found early it can usually be treated with success.    Pelvic Exams and Pap Tests:  An exam to check for cervical and vaginal cancer. A Pap test is a lab test in which cells are taken from your cervix and sent to  [General Appearance - Alert] : alert [General Appearance - In No Acute Distress] : in no acute distress the lab to look for signs of cervical cancer. If cancer of the cervix is found early, chances for a cure are good. Testing can generally end at age 65, or if a woman has a hysterectomy for a benign condition. Your provider may recommend more frequent testing if certain abnormal results are found.    Bone Mass Measurements:  A painless x-ray of your bone density to see if you are at risk for a broken bone. Bone density refers to the thickness of bones or how tightly the bone tissue is packed.    Preventive Screening tests for Men    Prostate Screening:  Should you have a prostate cancer test (PSA)?  It is up to you to decide if you want a prostate cancer test. Talk to your clinician to find out if the test is right for you.  Things for you to consider and talk about should include:  Benefits and harms of the test  Your family history  How your race/ethnicity may influence the test  If the test may impact other medical conditions you have  Your values on screenings and treatments    *Medicare pays for many preventive services to keep you healthy. For some of these services, you might have to pay a deductible, coinsurance, and / or copayment.  The amounts vary depending on the type of services you need and the kind of Medicare health plan you have.    For further details on screenings offered by Medicare please visit: https://www.medicare.gov/coverage/preventive-screening-services      [No Joint Swelling] : no joint swelling [Sensation] : the sensory exam was normal to light touch and pinprick [No Focal Deficits] : no focal deficits [Deep Tendon Reflexes (DTR)] : deep tendon reflexes were 2+ and symmetric [Motor Exam] : the motor exam was normal [Oriented To Time, Place, And Person] : oriented to person, place, and time [Impaired Insight] : insight and judgment were intact [Affect] : the affect was normal [Ankle Swelling (On Exam)] : not present [Varicose Veins Of Lower Extremities] : not present [] : not present [Delayed in the Right Toes] : capillary refills normal in right toes [Delayed in the Left Toes] : capillary refills normal in the left toes [de-identified] : pain on palpation to right plantar central and plantar heel due to the lesions [FreeTextEntry1] : Epithelial hyperplasia lesion noted Right plantar central midfoot and plantar heel.  Skin lines reduced within the lesion.  Pain on medial and lateral pressure.  After debridement punctate bleeding noted with in lesion.